# Patient Record
Sex: MALE | Race: WHITE | NOT HISPANIC OR LATINO | Employment: FULL TIME | URBAN - NONMETROPOLITAN AREA
[De-identification: names, ages, dates, MRNs, and addresses within clinical notes are randomized per-mention and may not be internally consistent; named-entity substitution may affect disease eponyms.]

---

## 2018-03-28 ENCOUNTER — OFFICE VISIT (OUTPATIENT)
Dept: INTERNAL MEDICINE | Facility: CLINIC | Age: 58
End: 2018-03-28

## 2018-03-28 VITALS
DIASTOLIC BLOOD PRESSURE: 84 MMHG | WEIGHT: 236 LBS | OXYGEN SATURATION: 98 % | SYSTOLIC BLOOD PRESSURE: 120 MMHG | TEMPERATURE: 98.6 F | HEART RATE: 73 BPM

## 2018-03-28 DIAGNOSIS — R63.5 WEIGHT GAIN: ICD-10-CM

## 2018-03-28 DIAGNOSIS — E11.9 CONTROLLED TYPE 2 DIABETES MELLITUS WITHOUT COMPLICATION, WITH LONG-TERM CURRENT USE OF INSULIN (HCC): Primary | ICD-10-CM

## 2018-03-28 DIAGNOSIS — E78.00 PURE HYPERCHOLESTEROLEMIA: ICD-10-CM

## 2018-03-28 DIAGNOSIS — K21.9 GASTROESOPHAGEAL REFLUX DISEASE, ESOPHAGITIS PRESENCE NOT SPECIFIED: ICD-10-CM

## 2018-03-28 DIAGNOSIS — I10 ESSENTIAL HYPERTENSION: ICD-10-CM

## 2018-03-28 DIAGNOSIS — E78.1 HYPERTRIGLYCERIDEMIA: ICD-10-CM

## 2018-03-28 DIAGNOSIS — Z79.4 CONTROLLED TYPE 2 DIABETES MELLITUS WITHOUT COMPLICATION, WITH LONG-TERM CURRENT USE OF INSULIN (HCC): Primary | ICD-10-CM

## 2018-03-28 PROCEDURE — 99204 OFFICE O/P NEW MOD 45 MIN: CPT | Performed by: PHYSICIAN ASSISTANT

## 2018-03-28 RX ORDER — FLURBIPROFEN SODIUM 0.3 MG/ML
SOLUTION/ DROPS OPHTHALMIC DAILY
COMMUNITY
Start: 2018-02-23 | End: 2019-03-29

## 2018-03-28 RX ORDER — ATORVASTATIN CALCIUM 80 MG/1
80 TABLET, FILM COATED ORAL DAILY
COMMUNITY
End: 2018-05-10 | Stop reason: SDUPTHER

## 2018-03-28 RX ORDER — ASPIRIN 81 MG/1
81 TABLET, CHEWABLE ORAL DAILY
COMMUNITY
End: 2018-05-15 | Stop reason: SDUPTHER

## 2018-03-28 RX ORDER — GEMFIBROZIL 600 MG/1
TABLET, FILM COATED ORAL 2 TIMES DAILY
COMMUNITY
Start: 2018-02-23 | End: 2018-05-10 | Stop reason: SDUPTHER

## 2018-03-28 RX ORDER — INSULIN GLARGINE 100 [IU]/ML
40 INJECTION, SOLUTION SUBCUTANEOUS DAILY
COMMUNITY
Start: 2018-03-02 | End: 2018-05-10 | Stop reason: SDUPTHER

## 2018-03-28 RX ORDER — FLUTICASONE PROPIONATE 50 MCG
2 SPRAY, SUSPENSION (ML) NASAL DAILY
COMMUNITY
Start: 2018-02-23 | End: 2018-05-10 | Stop reason: SDUPTHER

## 2018-03-28 RX ORDER — LORATADINE 10 MG/1
10 TABLET ORAL DAILY
COMMUNITY
End: 2018-05-10 | Stop reason: SDUPTHER

## 2018-03-28 RX ORDER — LISINOPRIL 10 MG/1
TABLET ORAL DAILY
COMMUNITY
Start: 2018-02-09 | End: 2018-05-10 | Stop reason: SDUPTHER

## 2018-03-28 RX ORDER — SILDENAFIL CITRATE 50 MG
TABLET ORAL AS NEEDED
COMMUNITY
Start: 2018-02-23 | End: 2019-06-20 | Stop reason: SDUPTHER

## 2018-03-28 NOTE — PROGRESS NOTES
Subjective   Wale Aguila is a 57 y.o. male who presents to [facility] care. [facility] PCP [facility]  [facility], Dr. Dye.     Chief Complaint:  Diagnosed with type 2 diabetes about 2 years ago and was immediately started on Metformin and Lantus. Glucose has never been above 200 that the knows of but initial A1C was around 10. Last A1C was around 8.  He currently uses 40 units Lantus each evening. He has noticed steady weight gain since he started Lantus, total of about 20 pounds in the last 2 years.  He attempts to practice portion control but is not great about limiting his carbohydrate intake.  Last labs about 2 months ago.  He walks at least 10,000 steps per day and sometimes uses his elliptical for exercise.     He has been using Lipitor and Lopid as directed for the last 4-5 years which he contributes to familial hyperlipidemia.     HTN- Well controlled with Lisinopril. Patient denies chest pain, dyspnea, orthopnea, palpitations, lower extremity edema, headaches, weakness, visual disturbances or confusion.     GERD- well controlled with Nexium.     Chronic but intermittent right shoulder pain since cervical disc herniation around 2008.  No numbness, tingling or burning in the arm.         The following portions of the patient's history were reviewed and updated as appropriate: He  has a past medical history of CPAP (continuous positive airway pressure) dependence; Diabetes mellitus; GERD (gastroesophageal reflux disease); Hyperlipidemia; and Hypertension.  He  does not have a problem list on file.  He  has a past surgical history that includes Neck surgery and Nasal septum surgery.  His family history includes Breast cancer in his cousin, paternal aunt, and paternal grandmother; Lung cancer in his father.  He  reports that he has quit smoking. His smoking use included Electronic Cigarette and Cigarettes. He has a 30.00 pack-year smoking history. He has never used smokeless tobacco. He reports that he  drinks alcohol. He reports that he does not use drugs.  Current Outpatient Prescriptions   Medication Sig Dispense Refill   • aspirin 81 MG chewable tablet Chew 81 mg Daily.     • atorvastatin (LIPITOR) 80 MG tablet Take 80 mg by mouth Daily.     • esomeprazole (nexIUM) 20 MG capsule Take 20 mg by mouth Every Morning Before Breakfast.     • B-D UF III MINI PEN NEEDLES 31G X 5 MM misc Daily.     • fluticasone (FLONASE) 50 MCG/ACT nasal spray 2 sprays into each nostril Daily.     • gemfibrozil (LOPID) 600 MG tablet 2 (Two) Times a Day.     • LANTUS SOLOSTAR 100 UNIT/ML injection pen Inject 40 Units under the skin Daily.     • lisinopril (PRINIVIL,ZESTRIL) 10 MG tablet Daily.     • loratadine (CLARITIN) 10 MG tablet Take 10 mg by mouth Daily.     • metFORMIN (GLUCOPHAGE) 1000 MG tablet 2 (Two) Times a Day.     • VIAGRA 50 MG tablet As Needed.       No current facility-administered medications for this visit.        Review of Systems  Review of Systems   Constitutional: Positive for unexpected weight change. Negative for activity change, appetite change, chills, fatigue and fever.        No malaise   HENT: Negative for ear pain, hearing loss, nosebleeds, rhinorrhea, sore throat, trouble swallowing and voice change.    Eyes: Negative for pain, discharge, redness, itching and visual disturbance.        No dry eyes   Respiratory: Negative for cough, chest tightness, shortness of breath and wheezing.         No SOB with exertion  No SOB lying down   Cardiovascular: Negative for chest pain, palpitations and leg swelling.        No leg cramps   Gastrointestinal: Negative for abdominal pain, blood in stool, constipation, diarrhea, nausea and vomiting.        No frequent heartburn  No change in bowel habits   Endocrine: Negative for cold intolerance, heat intolerance, polydipsia, polyphagia and polyuria.        No Muscle weakness  No feeling of weakness  No Hot flashes   Genitourinary: Negative for decreased urine volume,  difficulty urinating, discharge, dysuria, frequency, hematuria, penile pain, testicular pain and urgency.        No lump on testicles   Musculoskeletal: Negative for arthralgias, gait problem, joint swelling and myalgias.        No limb pain  No limb swelling   Skin: Negative for rash and wound.        No rash  No lesions  No Itching  No change in mole   Neurological: Negative for dizziness, tremors, seizures, syncope, weakness, numbness and headaches.        No trouble walking  No confusion  No tingling       Hematological: Negative for adenopathy. Does not bruise/bleed easily.   Psychiatric/Behavioral: Negative for dysphoric mood, sleep disturbance and suicidal ideas. The patient is not nervous/anxious.         No change in personality         Objective    /84   Pulse 73   Temp 98.6 °F (37 °C)   Wt 107 kg (236 lb)   SpO2 98%   Physical Exam   Constitutional: He is oriented to person, place, and time. He appears well-developed and well-nourished. No distress.   HENT:   Head: Normocephalic and atraumatic.   Right Ear: External ear normal.   Left Ear: External ear normal.   Mouth/Throat: Oropharynx is clear and moist. No oropharyngeal exudate.   Eyes: EOM are normal. Pupils are equal, round, and reactive to light. No scleral icterus.   Neck: Normal range of motion. Neck supple. No thyromegaly present.   Cardiovascular: Normal rate, regular rhythm and normal heart sounds.  Exam reveals no gallop and no friction rub.    No murmur heard.  Pulmonary/Chest: Effort normal and breath sounds normal. No respiratory distress. He has no wheezes. He has no rales. He exhibits no tenderness.   Abdominal: Soft. Bowel sounds are normal. He exhibits no distension. There is no tenderness. There is no guarding.   Musculoskeletal: Normal range of motion. He exhibits no tenderness.   Lymphadenopathy:     He has no cervical adenopathy.   Neurological: He is alert and oriented to person, place, and time. He exhibits normal muscle  tone.   Skin: Skin is warm and dry. He is not diaphoretic.   Psychiatric: He has a normal mood and affect. His behavior is normal. Judgment and thought content normal.   Nursing note and vitals reviewed.        Assessment/Plan      Diagnosis Plan   1. Controlled type 2 diabetes mellitus without complication, with long-term current use of insulin  Obtain labs from 2 months ago. Continue current medication regimen in the meantime.    2. Essential hypertension  Well controlled with Lisinopril, continue     3. Pure hypercholesterolemia  Taking Lipitor as directed, continue.      4. Hypertriglyceridemia  Taking Lopid as directed.      5. Weight gain  Consider TSH if not recently checked.      6. Gastroesophageal reflux disease, esophagitis presence not specified  Well controlled with Nexium daily, continue.      Obtain labs from previous PCP. Records release signed.       Return in about 6 months (around 9/28/2018) for Next scheduled follow up.

## 2018-05-10 ENCOUNTER — TELEPHONE (OUTPATIENT)
Dept: INTERNAL MEDICINE | Facility: CLINIC | Age: 58
End: 2018-05-10

## 2018-05-10 RX ORDER — FLUTICASONE PROPIONATE 50 MCG
2 SPRAY, SUSPENSION (ML) NASAL DAILY
Qty: 3 BOTTLE | Refills: 3 | Status: SHIPPED | OUTPATIENT
Start: 2018-05-10 | End: 2018-05-15 | Stop reason: SDUPTHER

## 2018-05-10 RX ORDER — LISINOPRIL 10 MG/1
10 TABLET ORAL DAILY
Qty: 90 TABLET | Refills: 1 | Status: SHIPPED | OUTPATIENT
Start: 2018-05-10 | End: 2018-05-15 | Stop reason: SDUPTHER

## 2018-05-10 RX ORDER — GEMFIBROZIL 600 MG/1
600 TABLET, FILM COATED ORAL 2 TIMES DAILY
Qty: 180 TABLET | Refills: 1 | Status: SHIPPED | OUTPATIENT
Start: 2018-05-10 | End: 2018-05-15 | Stop reason: SDUPTHER

## 2018-05-10 RX ORDER — LORATADINE 10 MG/1
10 TABLET ORAL DAILY
Qty: 90 TABLET | Refills: 1 | Status: SHIPPED | OUTPATIENT
Start: 2018-05-10 | End: 2018-05-15 | Stop reason: SDUPTHER

## 2018-05-10 RX ORDER — ATORVASTATIN CALCIUM 80 MG/1
80 TABLET, FILM COATED ORAL DAILY
Qty: 90 TABLET | Refills: 1 | Status: SHIPPED | OUTPATIENT
Start: 2018-05-10 | End: 2018-05-15 | Stop reason: SDUPTHER

## 2018-05-10 NOTE — TELEPHONE ENCOUNTER
Pt left message on refill line that he needs all his prescriptions sent to [facility]

## 2018-05-15 RX ORDER — ASPIRIN 81 MG/1
81 TABLET, CHEWABLE ORAL DAILY
Qty: 90 TABLET | Refills: 1 | Status: SHIPPED | OUTPATIENT
Start: 2018-05-15

## 2018-05-15 RX ORDER — FLUTICASONE PROPIONATE 50 MCG
2 SPRAY, SUSPENSION (ML) NASAL DAILY
Qty: 3 BOTTLE | Refills: 3 | Status: SHIPPED | OUTPATIENT
Start: 2018-05-15 | End: 2019-07-18 | Stop reason: SDUPTHER

## 2018-05-15 RX ORDER — ATORVASTATIN CALCIUM 80 MG/1
80 TABLET, FILM COATED ORAL DAILY
Qty: 90 TABLET | Refills: 1 | Status: SHIPPED | OUTPATIENT
Start: 2018-05-15 | End: 2018-10-02 | Stop reason: SDUPTHER

## 2018-05-15 RX ORDER — LISINOPRIL 10 MG/1
10 TABLET ORAL DAILY
Qty: 90 TABLET | Refills: 1 | Status: SHIPPED | OUTPATIENT
Start: 2018-05-15 | End: 2018-12-05 | Stop reason: SDUPTHER

## 2018-05-15 RX ORDER — GEMFIBROZIL 600 MG/1
600 TABLET, FILM COATED ORAL 2 TIMES DAILY
Qty: 180 TABLET | Refills: 1 | Status: SHIPPED | OUTPATIENT
Start: 2018-05-15 | End: 2018-09-28

## 2018-05-15 RX ORDER — LORATADINE 10 MG/1
10 TABLET ORAL DAILY
Qty: 90 TABLET | Refills: 1 | Status: SHIPPED | OUTPATIENT
Start: 2018-05-15 | End: 2018-10-28 | Stop reason: SDUPTHER

## 2018-09-12 ENCOUNTER — TELEPHONE (OUTPATIENT)
Dept: INTERNAL MEDICINE | Facility: CLINIC | Age: 58
End: 2018-09-12

## 2018-09-28 ENCOUNTER — OFFICE VISIT (OUTPATIENT)
Dept: INTERNAL MEDICINE | Facility: CLINIC | Age: 58
End: 2018-09-28

## 2018-09-28 VITALS
OXYGEN SATURATION: 99 % | HEART RATE: 75 BPM | HEIGHT: 70 IN | BODY MASS INDEX: 28.35 KG/M2 | TEMPERATURE: 98.5 F | WEIGHT: 198 LBS | SYSTOLIC BLOOD PRESSURE: 120 MMHG | DIASTOLIC BLOOD PRESSURE: 70 MMHG

## 2018-09-28 DIAGNOSIS — Z79.4 CONTROLLED TYPE 2 DIABETES MELLITUS WITHOUT COMPLICATION, WITH LONG-TERM CURRENT USE OF INSULIN (HCC): Primary | ICD-10-CM

## 2018-09-28 DIAGNOSIS — E78.2 MIXED HYPERLIPIDEMIA: ICD-10-CM

## 2018-09-28 DIAGNOSIS — Z11.59 NEED FOR HEPATITIS C SCREENING TEST: ICD-10-CM

## 2018-09-28 DIAGNOSIS — E11.9 CONTROLLED TYPE 2 DIABETES MELLITUS WITHOUT COMPLICATION, WITH LONG-TERM CURRENT USE OF INSULIN (HCC): Primary | ICD-10-CM

## 2018-09-28 DIAGNOSIS — I10 ESSENTIAL HYPERTENSION: ICD-10-CM

## 2018-09-28 DIAGNOSIS — Z23 NEED FOR INFLUENZA VACCINATION: ICD-10-CM

## 2018-09-28 PROBLEM — E78.00 PURE HYPERCHOLESTEROLEMIA: Status: ACTIVE | Noted: 2018-09-28

## 2018-09-28 PROCEDURE — 90471 IMMUNIZATION ADMIN: CPT | Performed by: PHYSICIAN ASSISTANT

## 2018-09-28 PROCEDURE — 90674 CCIIV4 VAC NO PRSV 0.5 ML IM: CPT | Performed by: PHYSICIAN ASSISTANT

## 2018-09-28 PROCEDURE — 99214 OFFICE O/P EST MOD 30 MIN: CPT | Performed by: PHYSICIAN ASSISTANT

## 2018-09-28 RX ORDER — GEMFIBROZIL 600 MG/1
600 TABLET, FILM COATED ORAL 2 TIMES DAILY
Qty: 180 TABLET | Refills: 1 | Status: CANCELLED | OUTPATIENT
Start: 2018-09-28

## 2018-09-28 NOTE — PROGRESS NOTES
Wale Aguila is a 58 y.o. male.     Subjective   History of Present Illness   Here today for follow up of chronic conditions.     DM type 2:  He has lost nearly 40 pounds in the last 6 months intentionally with diet and staying active.  He occasionally checks his glucose and it is around 70-90 5+ hours after eating and 150-160 around 2 hours postprandial.  He is up to date with an eye exam. He is taking Metformin 1000 mg bid and Lantus 40 units nightly. Patient denies foot ulcerations, hyperglycemia, hypoglycemia, nausea, paresthesia of the feet, polydipsia, polyuria, polyphagia and visual disturbances.    GERD: He found that he has not needed to take Nexium for acid reflux in the last 2 months since losing weight.     HTN: blood pressure is well controlled. Patient denies chest pain, dyspnea, orthopnea, palpitations, lower extremity edema, headaches, weakness, visual disturbances or confusion.     HLD: he has had some myalgias with Lipitor. He also takes Lopid which is known to cause higher potential for rhabdomyolysis in combination with some statins.       The following portions of the patient's history were reviewed and updated as appropriate: allergies, current medications, past family history, past medical history, past social history, past surgical history and problem list.    Review of Systems    Constitutional: intentional weight loss. Negative for appetite change, chills, fatigue, fever.  Respiratory: Negative for cough, chest tightness, shortness of breath and wheezing.    Cardiovascular: Negative for chest pain, palpitations and leg swelling.   Gastrointestinal: acid reflux-resolved. Negative for abdominal distention, abdominal pain, blood in stool, constipation, diarrhea, nausea and vomiting.   Endocrine: Negative for cold intolerance, heat intolerance, polydipsia, polyphagia and polyuria.   Musculoskeletal: myalgias. Negative for arthralgias, back pain, joint swelling, neck pain and neck stiffness.  "  Skin: Negative for color change, pallor, rash and wound.   Allergic/Immunologic: Negative for environmental allergies, food allergies and immunocompromised state.   Neurological: Negative for dizziness, tremors, seizures, weakness, numbness and headaches.   Hematological: Negative for adenopathy. Does not bruise/bleed easily.   Psychiatric/Behavioral: Negative for sleep disturbances, agitation, behavioral problems, confusion, hallucinations, self-injury and suicidal ideas. The patient is not nervous/anxious.      Objective    Physical Exam  Constitutional: Appears well-developed and well-nourished.   Cardiovascular: Normal rate, regular rhythm and normal heart sounds.    Pulmonary/Chest: Effort normal and breath sounds normal. No respiratory distress.  Has no wheezes or rales. Exhibits no chest wall tenderness.   Abdominal: Soft. Bowel sounds are normal. Exhibits no distension and no mass. There is no tenderness.   Musculoskeletal: Normal range of motion. Exhibits no tenderness.   Neurological: Alert and oriented to person, place, and time.   Skin: Skin is warm and dry.   Psychiatric: Has a normal mood and affect. Behavior is normal. Judgment and thought content normal.       /70   Pulse 75   Temp 98.5 °F (36.9 °C)   Ht 177.8 cm (70\")   Wt 89.8 kg (198 lb)   SpO2 99%   BMI 28.41 kg/m²     Nursing note and vitals reviewed.        Assessment/Plan   Wale was seen today for follow-up and diabetes.    Diagnoses and all orders for this visit:    Controlled type 2 diabetes mellitus without complication, with long-term current use of insulin (CMS/[facility])  -     Hemoglobin A1c  -     MicroAlbumin, Urine, Random - Urine, Clean Catch  Follow diabetic diet. Take all medications as prescribed.  Exercise as tolerated up to 30 minutes 5 days a week.  Monitor blood sugars as discussed. See eye doctor annually.  Wear protective foot wear/no bare feet. Check feet regularly for calluses or ulcers.  Discussed risk of poorly " controlled diabetes and long-term complications.    -He would like to come off Lantus and begin a second diabetes med if possible.  Will consider after review of labs.     Essential hypertension  -     Comprehensive Metabolic Panel    Mixed hyperlipidemia  -     Lipid Panel  Stop lopid due to potential for rhabdo with statin use. Has drastically reduced carb/sugar intake and lost 40 pounds.   Continue Lipitor.     Need for hepatitis C screening test  -     Hepatitis C Antibody    Need for influenza vaccination  -     Flucelvax Quad (Vial) =>4 yrs ([id number])        Return in 6 months for annual physical.

## 2018-09-29 LAB
ALBUMIN SERPL-MCNC: 4.6 G/DL (ref 3.5–5)
ALBUMIN/GLOB SERPL: 2 G/DL (ref 1–2)
ALP SERPL-CCNC: 102 U/L (ref 38–126)
ALT SERPL-CCNC: 48 U/L (ref 13–69)
AST SERPL-CCNC: 23 U/L (ref 15–46)
BILIRUB SERPL-MCNC: 0.6 MG/DL (ref 0.2–1.3)
BUN SERPL-MCNC: 11 MG/DL (ref 7–20)
BUN/CREAT SERPL: 10 (ref 6.3–21.9)
CALCIUM SERPL-MCNC: 10.3 MG/DL (ref 8.4–10.2)
CHLORIDE SERPL-SCNC: 105 MMOL/L (ref 98–107)
CHOLEST SERPL-MCNC: 93 MG/DL (ref 0–199)
CO2 SERPL-SCNC: 29 MMOL/L (ref 26–30)
CREAT SERPL-MCNC: 1.1 MG/DL (ref 0.6–1.3)
GLOBULIN SER CALC-MCNC: 2.3 GM/DL
GLUCOSE SERPL-MCNC: 86 MG/DL (ref 74–98)
HBA1C MFR BLD: 5.4 %
HCV AB S/CO SERPL IA: <0.1 S/CO RATIO (ref 0–0.9)
HDLC SERPL-MCNC: 40 MG/DL (ref 40–60)
LDLC SERPL CALC-MCNC: 32 MG/DL (ref 0–99)
MICROALBUMIN UR-MCNC: <3 UG/ML
POTASSIUM SERPL-SCNC: 4.1 MMOL/L (ref 3.5–5.1)
PROT SERPL-MCNC: 6.9 G/DL (ref 6.3–8.2)
SODIUM SERPL-SCNC: 142 MMOL/L (ref 137–145)
TRIGL SERPL-MCNC: 107 MG/DL
VLDLC SERPL CALC-MCNC: 21.4 MG/DL

## 2018-10-02 DIAGNOSIS — E11.9 TYPE 2 DIABETES MELLITUS WITHOUT COMPLICATION, UNSPECIFIED WHETHER LONG TERM INSULIN USE (HCC): Primary | ICD-10-CM

## 2018-10-02 RX ORDER — ATORVASTATIN CALCIUM 40 MG/1
40 TABLET, FILM COATED ORAL NIGHTLY
Qty: 90 TABLET | Refills: 3 | Status: SHIPPED | OUTPATIENT
Start: 2018-10-02 | End: 2018-10-02 | Stop reason: SDUPTHER

## 2018-10-02 RX ORDER — ATORVASTATIN CALCIUM 40 MG/1
40 TABLET, FILM COATED ORAL NIGHTLY
Qty: 90 TABLET | Refills: 3 | Status: SHIPPED | OUTPATIENT
Start: 2018-10-02 | End: 2018-11-30 | Stop reason: SDUPTHER

## 2018-10-02 RX ORDER — GLIPIZIDE 2.5 MG/1
2.5 TABLET, EXTENDED RELEASE ORAL
Qty: 90 TABLET | Refills: 3 | Status: SHIPPED | OUTPATIENT
Start: 2018-10-02 | End: 2018-10-02 | Stop reason: SDUPTHER

## 2018-10-02 RX ORDER — GLIPIZIDE 2.5 MG/1
2.5 TABLET, EXTENDED RELEASE ORAL
Qty: 90 TABLET | Refills: 3 | Status: SHIPPED | OUTPATIENT
Start: 2018-10-02 | End: 2018-11-30 | Stop reason: SDUPTHER

## 2018-10-10 ENCOUNTER — PATIENT MESSAGE (OUTPATIENT)
Dept: INTERNAL MEDICINE | Facility: CLINIC | Age: 58
End: 2018-10-10

## 2018-10-11 RX ORDER — LANCETS 28 GAUGE
EACH MISCELLANEOUS
Qty: 100 EACH | Refills: 12 | Status: SHIPPED | OUTPATIENT
Start: 2018-10-11 | End: 2021-12-08

## 2018-10-11 NOTE — TELEPHONE ENCOUNTER
From: Wale Aguila  To: Jodi Monique PA  Sent: 10/10/2018 8:13 PM EDT  Subject: Prescription Question    Ms Monique, I am needing a prescription for FreeStyle Lancets Mis Abbo please.     I had it last filled at the [facility] pharmacy and the refills are . My Blood Surgar has been 78-80s just before dinner.     I attacked a picture of the box.     Thank you,

## 2018-10-29 RX ORDER — LORATADINE 10 MG/1
TABLET ORAL
Qty: 90 TABLET | Refills: 3 | Status: SHIPPED | OUTPATIENT
Start: 2018-10-29 | End: 2019-10-01

## 2018-11-30 RX ORDER — ATORVASTATIN CALCIUM 40 MG/1
40 TABLET, FILM COATED ORAL NIGHTLY
Qty: 90 TABLET | Refills: 1 | Status: SHIPPED | OUTPATIENT
Start: 2018-11-30 | End: 2019-03-29 | Stop reason: SDUPTHER

## 2018-11-30 RX ORDER — GLIPIZIDE 2.5 MG/1
2.5 TABLET, EXTENDED RELEASE ORAL
Qty: 90 TABLET | Refills: 1 | Status: SHIPPED | OUTPATIENT
Start: 2018-11-30 | End: 2019-03-29 | Stop reason: SDUPTHER

## 2018-12-05 RX ORDER — LISINOPRIL 10 MG/1
TABLET ORAL
Qty: 90 TABLET | Refills: 1 | Status: SHIPPED | OUTPATIENT
Start: 2018-12-05 | End: 2019-03-29 | Stop reason: SDUPTHER

## 2019-03-29 ENCOUNTER — OFFICE VISIT (OUTPATIENT)
Dept: INTERNAL MEDICINE | Facility: CLINIC | Age: 59
End: 2019-03-29

## 2019-03-29 VITALS
SYSTOLIC BLOOD PRESSURE: 118 MMHG | BODY MASS INDEX: 28.63 KG/M2 | DIASTOLIC BLOOD PRESSURE: 82 MMHG | OXYGEN SATURATION: 99 % | WEIGHT: 200 LBS | HEART RATE: 74 BPM | TEMPERATURE: 98.4 F | HEIGHT: 70 IN

## 2019-03-29 DIAGNOSIS — Z12.5 SCREENING PSA (PROSTATE SPECIFIC ANTIGEN): ICD-10-CM

## 2019-03-29 DIAGNOSIS — E78.00 PURE HYPERCHOLESTEROLEMIA: ICD-10-CM

## 2019-03-29 DIAGNOSIS — Z79.4 CONTROLLED TYPE 2 DIABETES MELLITUS WITHOUT COMPLICATION, WITH LONG-TERM CURRENT USE OF INSULIN (HCC): ICD-10-CM

## 2019-03-29 DIAGNOSIS — I10 ESSENTIAL HYPERTENSION: ICD-10-CM

## 2019-03-29 DIAGNOSIS — E11.9 CONTROLLED TYPE 2 DIABETES MELLITUS WITHOUT COMPLICATION, WITH LONG-TERM CURRENT USE OF INSULIN (HCC): ICD-10-CM

## 2019-03-29 DIAGNOSIS — Z00.00 ANNUAL PHYSICAL EXAM: Primary | ICD-10-CM

## 2019-03-29 LAB
ALBUMIN SERPL-MCNC: 4.3 G/DL (ref 3.5–5)
ALBUMIN/GLOB SERPL: 1.7 G/DL (ref 1–2)
ALP SERPL-CCNC: 110 U/L (ref 38–126)
ALT SERPL-CCNC: 43 U/L (ref 13–69)
AST SERPL-CCNC: 21 U/L (ref 15–46)
BILIRUB SERPL-MCNC: 0.4 MG/DL (ref 0.2–1.3)
BUN SERPL-MCNC: 17 MG/DL (ref 7–20)
BUN/CREAT SERPL: 15.5 (ref 6.3–21.9)
CALCIUM SERPL-MCNC: 10.8 MG/DL (ref 8.4–10.2)
CHLORIDE SERPL-SCNC: 104 MMOL/L (ref 98–107)
CO2 SERPL-SCNC: 26 MMOL/L (ref 26–30)
CREAT SERPL-MCNC: 1.1 MG/DL (ref 0.6–1.3)
GLOBULIN SER CALC-MCNC: 2.5 GM/DL
GLUCOSE SERPL-MCNC: 122 MG/DL (ref 74–98)
HBA1C MFR BLD: 5.6 % (ref 4.8–5.6)
POTASSIUM SERPL-SCNC: 4.5 MMOL/L (ref 3.5–5.1)
PROT SERPL-MCNC: 6.8 G/DL (ref 6.3–8.2)
PSA SERPL-MCNC: 0.97 NG/ML (ref 0.06–4)
SODIUM SERPL-SCNC: 141 MMOL/L (ref 137–145)

## 2019-03-29 PROCEDURE — 99396 PREV VISIT EST AGE 40-64: CPT | Performed by: PHYSICIAN ASSISTANT

## 2019-03-29 RX ORDER — GLIPIZIDE 2.5 MG/1
2.5 TABLET, EXTENDED RELEASE ORAL
Qty: 90 TABLET | Refills: 1 | Status: SHIPPED | OUTPATIENT
Start: 2019-03-29 | End: 2019-10-01

## 2019-03-29 RX ORDER — ATORVASTATIN CALCIUM 40 MG/1
40 TABLET, FILM COATED ORAL NIGHTLY
Qty: 90 TABLET | Refills: 1 | Status: SHIPPED | OUTPATIENT
Start: 2019-03-29 | End: 2019-10-01 | Stop reason: SDUPTHER

## 2019-03-29 RX ORDER — LISINOPRIL 10 MG/1
10 TABLET ORAL DAILY
Qty: 90 TABLET | Refills: 1 | Status: SHIPPED | OUTPATIENT
Start: 2019-03-29 | End: 2019-10-01 | Stop reason: SDUPTHER

## 2019-03-29 NOTE — PROGRESS NOTES
Subjective   Wale Aguila is a 58 y.o. male and is here for a comprehensive physical exam. The patient reports no new problems.    HPI: he does not check his glucose daily but when he does it runs 80-90 fasting and up to 116 before dinner. He has discontinued Lantus entirely as directed and is taking Metformin and Glipizide as directed. He has had some numbness and tingling in his feet for the last couple of years which is more prominent in the evenings but is not too bothersome. Patient denies foot ulcerations, hyperglycemia, hypoglycemia, nausea, polydipsia, polyuria, polyphagia, visual disturbances and weight loss.  He keeps a healthy diet and stays physically active with outside work.           Do you take any herbs or supplements that were not prescribed by a doctor? yes, multi-vitamin  Are you taking calcium supplements? No  Are you taking aspirin daily? Yes     History:  Patient receives prostate care here: Yes  Date last prostate exam: couple of years  Date last PSA: unsure  He reports Some decrease in urinary stream,  Some nocturia, No dribbling, No hesitancy.    Family history of prostate cancer: no.     has no sexual activity history on file.    The following portions of the patient's history were reviewed and updated as appropriate: He  has a past medical history of CPAP (continuous positive airway pressure) dependence, Diabetes mellitus (CMS/HCC), GERD (gastroesophageal reflux disease), Hyperlipidemia, and Hypertension.  He does not have any pertinent problems on file.  He  has a past surgical history that includes Neck surgery and Nasal septum surgery.  His family history includes Breast cancer in his cousin, paternal aunt, and paternal grandmother; Lung cancer in his father.  He  reports that he has quit smoking. His smoking use included electronic cigarette and cigarettes. He has a 30.00 pack-year smoking history. He has never used smokeless tobacco. He reports that he drinks alcohol. He reports  that he does not use drugs.  Current Outpatient Medications   Medication Sig Dispense Refill   • aspirin 81 MG chewable tablet Chew 1 tablet Daily. 90 tablet 1   • atorvastatin (LIPITOR) 40 MG tablet Take 1 tablet by mouth Every Night. 90 tablet 1   • fluticasone (FLONASE) 50 MCG/ACT nasal spray 2 sprays into each nostril Daily. 3 bottle 3   • glipiZIDE (GLUCOTROL XL) 2.5 MG 24 hr tablet Take 1 tablet by mouth Daily With Breakfast. 90 tablet 1   • Lancets (FREESTYLE) lancets Use one a day, as directed 100 each 12   • lisinopril (PRINIVIL,ZESTRIL) 10 MG tablet Take 1 tablet by mouth Daily. 90 tablet 1   • loratadine (CLARITIN) 10 MG tablet TAKE 1 TABLET DAILY 90 tablet 3   • metFORMIN (GLUCOPHAGE) 1000 MG tablet TAKE 1 TABLET TWICE A  tablet 1   • VIAGRA 50 MG tablet As Needed.       No current facility-administered medications for this visit.        Review of Systems  Do you have pain that bothers you in your daily life? no    Review of Systems   Constitutional: Negative for appetite change, chills, fatigue, fever and unexpected weight change.   HENT: Negative for congestion, ear pain, hearing loss, nosebleeds, postnasal drip, rhinorrhea, sore throat, tinnitus and trouble swallowing.    Eyes: Negative for photophobia, pain, discharge and visual disturbance.   Respiratory: Negative for cough, chest tightness, shortness of breath and wheezing.    Cardiovascular: Negative for chest pain, palpitations and leg swelling.   Gastrointestinal: Negative for abdominal distention, abdominal pain, blood in stool, constipation, diarrhea, nausea and vomiting.   Endocrine: Negative for cold intolerance, heat intolerance, polydipsia, polyphagia and polyuria.   Genitourinary: Positive for frequency (mild nocturia). Negative for decreased urine volume, difficulty urinating, discharge, dysuria, enuresis, hematuria, penile swelling, testicular pain and urgency.   Musculoskeletal: Positive for back pain (intermittent ache).  "Negative for arthralgias, joint swelling, myalgias, neck pain and neck stiffness.   Skin: Negative for color change, pallor, rash and wound.   Allergic/Immunologic: Negative for environmental allergies, food allergies and immunocompromised state.   Neurological: Positive for numbness. Negative for dizziness, tremors, seizures, weakness and headaches.   Hematological: Negative for adenopathy. Does not bruise/bleed easily.   Psychiatric/Behavioral: Negative for agitation, behavioral problems, confusion, hallucinations, self-injury and suicidal ideas. The patient is not nervous/anxious.          Objective   /82   Pulse 74   Temp 98.4 °F (36.9 °C)   Ht 177.8 cm (70\")   Wt 90.7 kg (200 lb)   SpO2 99%   BMI 28.70 kg/m²     Physical Exam   Constitutional: He is oriented to person, place, and time. He appears well-developed and well-nourished. No distress.   HENT:   Head: Normocephalic and atraumatic.   Right Ear: External ear normal.   Left Ear: External ear normal.   Nose: Nose normal.   Mouth/Throat: Oropharynx is clear and moist. No oropharyngeal exudate.   Eyes: Conjunctivae and EOM are normal. Pupils are equal, round, and reactive to light. No scleral icterus.   Neck: Normal range of motion. Neck supple. No thyromegaly present.   Cardiovascular: Normal rate, regular rhythm, normal heart sounds and intact distal pulses. Exam reveals no gallop and no friction rub.   No murmur heard.  Pulmonary/Chest: Effort normal and breath sounds normal. No stridor. No respiratory distress. He has no wheezes. He has no rales. He exhibits no tenderness.   Abdominal: Soft. Bowel sounds are normal. He exhibits no distension and no mass. There is no tenderness. There is no rebound and no guarding. No hernia.   Musculoskeletal: Normal range of motion. He exhibits no tenderness.    Wale had a diabetic foot exam performed today.   During the foot exam he had a monofilament test performed.    Neurological Sensory Findings - " Unaltered hot/cold right ankle/foot discrimination and unaltered hot/cold left ankle/foot discrimination. Unaltered sharp/dull right ankle/foot discrimination and unaltered sharp/dull left ankle/foot discrimination. No right ankle/foot altered proprioception and no left ankle/foot altered proprioception  Vascular Status -  His right foot exhibits normal foot vasculature  and no edema. His left foot exhibits normal foot vasculature  and no edema.  Skin Integrity  -  His right foot skin is intact.His left foot skin is intact..  Lymphadenopathy:     He has no cervical adenopathy.   Neurological: He is alert and oriented to person, place, and time. He displays normal reflexes. No cranial nerve deficit or sensory deficit.   Skin: Skin is warm and dry. Capillary refill takes less than 2 seconds. No rash noted. He is not diaphoretic. No pallor.   Psychiatric: He has a normal mood and affect. His behavior is normal. Judgment and thought content normal.   Nursing note and vitals reviewed.         Assessment/Plan   Healthy male exam.     1.    Diagnosis Plan   1. Annual physical exam     2. BMI 28.0-28.9,adult  Patient's Body mass index is 28.7 kg/m². BMI is above normal parameters. Recommendations include: exercise counseling and nutrition counseling.     3. Controlled type 2 diabetes mellitus without complication, with long-term current use of insulin (CMS/[facility])  Continue: glipiZIDE (GLUCOTROL XL) 2.5 MG 24 hr tablet  Continue: Metformin 1000 mg bid.     Hemoglobin A1c    Comprehensive Metabolic Panel    Follow diabetic diet. Take all medications as prescribed.  Exercise as tolerated up to 30 minutes 5 days a week.  Monitor blood sugars as discussed. See eye doctor annually.  Wear protective foot wear/no bare feet. Check feet regularly for calluses or ulcers.  Discussed risk of poorly controlled diabetes and long-term complications.     4. Pure hypercholesterolemia  Continue: atorvastatin (LIPITOR) 40 MG tablet     5.  Essential hypertension  Well controlled, continue: lisinopril (PRINIVIL,ZESTRIL) 10 MG tablet    Comprehensive Metabolic Panel     6. Screening PSA (prostate specific antigen)  PSA Screen       2. Patient Counseling:  --Nutrition: Stressed importance of moderation in sodium/caffeine intake, saturated fat and cholesterol, caloric balance, sufficient intake of fresh fruits, vegetables, fiber, calcium and iron.  --Discussed the issue of calcium supplement, and the daily use of baby aspirin if applicable.  --Exercise: Stressed the importance of regular exercise.   --Substance Abuse: Discussed cessation/primary prevention of tobacco (if applicable, alcohol, or other drug use (if applicable); driving or other dangerous activities under the influence; availability of treatment for abuse.    --Sexuality: Discussed sexually transmitted diseases, partner selection, use of condoms, avoidance of unintended pregnancy  and contraceptive alternatives.   --Injury prevention: Discussed safety belts, safety helmets, smoke detector, smoking near bedding or upholstery.   --Dental health: Discussed importance of regular tooth brushing, flossing, and dental visits.  --Immunizations reviewed.  --Discussed benefits of screening colonoscopy (if applicable).  --After hours service discussed with patient.    3. Discussed the patient's BMI with him.  The BMI is above average; BMI management plan is completed.  4. Return in about 6 months (around 9/29/2019) for Next scheduled follow up.         REENA Morocho  03/29/2019  8:17 AM

## 2019-04-01 DIAGNOSIS — E83.52 SERUM CALCIUM ELEVATED: Primary | ICD-10-CM

## 2019-04-16 LAB — CA-I SERPL ISE-MCNC: 5.8 MG/DL (ref 4.5–5.6)

## 2019-04-18 DIAGNOSIS — E83.52 HYPERCALCEMIA: ICD-10-CM

## 2019-04-18 DIAGNOSIS — E83.52 SERUM CALCIUM ELEVATED: Primary | ICD-10-CM

## 2019-04-27 LAB
1,25(OH)2D3 SERPL-MCNC: 33.9 PG/ML (ref 19.9–79.3)
25(OH)D3+25(OH)D2 SERPL-MCNC: 54 NG/ML
PTH RELATED PROT SERPL-SCNC: <2 PMOL/L
PTH-INTACT SERPL-MCNC: 27 PG/ML (ref 15–65)

## 2019-04-29 ENCOUNTER — TELEPHONE (OUTPATIENT)
Dept: INTERNAL MEDICINE | Facility: CLINIC | Age: 59
End: 2019-04-29

## 2019-06-20 RX ORDER — SILDENAFIL 50 MG/1
TABLET, FILM COATED ORAL
Qty: 30 TABLET | Refills: 5 | Status: SHIPPED | OUTPATIENT
Start: 2019-06-20 | End: 2020-08-27

## 2019-07-18 RX ORDER — FLUTICASONE PROPIONATE 50 MCG
SPRAY, SUSPENSION (ML) NASAL
Qty: 48 G | Refills: 3 | Status: SHIPPED | OUTPATIENT
Start: 2019-07-18 | End: 2020-08-27

## 2019-10-01 ENCOUNTER — OFFICE VISIT (OUTPATIENT)
Dept: INTERNAL MEDICINE | Facility: CLINIC | Age: 59
End: 2019-10-01

## 2019-10-01 VITALS
HEART RATE: 72 BPM | OXYGEN SATURATION: 99 % | TEMPERATURE: 98.5 F | HEIGHT: 70 IN | WEIGHT: 205 LBS | SYSTOLIC BLOOD PRESSURE: 128 MMHG | BODY MASS INDEX: 29.35 KG/M2 | DIASTOLIC BLOOD PRESSURE: 78 MMHG

## 2019-10-01 DIAGNOSIS — E11.9 CONTROLLED TYPE 2 DIABETES MELLITUS WITHOUT COMPLICATION, WITH LONG-TERM CURRENT USE OF INSULIN (HCC): ICD-10-CM

## 2019-10-01 DIAGNOSIS — E78.00 PURE HYPERCHOLESTEROLEMIA: ICD-10-CM

## 2019-10-01 DIAGNOSIS — I10 ESSENTIAL HYPERTENSION: ICD-10-CM

## 2019-10-01 DIAGNOSIS — Z79.4 CONTROLLED TYPE 2 DIABETES MELLITUS WITHOUT COMPLICATION, WITH LONG-TERM CURRENT USE OF INSULIN (HCC): ICD-10-CM

## 2019-10-01 DIAGNOSIS — Z23 NEED FOR INFLUENZA VACCINATION: Primary | ICD-10-CM

## 2019-10-01 PROCEDURE — 90674 CCIIV4 VAC NO PRSV 0.5 ML IM: CPT | Performed by: PHYSICIAN ASSISTANT

## 2019-10-01 PROCEDURE — 99214 OFFICE O/P EST MOD 30 MIN: CPT | Performed by: PHYSICIAN ASSISTANT

## 2019-10-01 PROCEDURE — 90471 IMMUNIZATION ADMIN: CPT | Performed by: PHYSICIAN ASSISTANT

## 2019-10-01 RX ORDER — ATORVASTATIN CALCIUM 40 MG/1
40 TABLET, FILM COATED ORAL NIGHTLY
Qty: 90 TABLET | Refills: 1 | Status: SHIPPED | OUTPATIENT
Start: 2019-10-01 | End: 2020-06-24

## 2019-10-01 RX ORDER — LISINOPRIL 10 MG/1
10 TABLET ORAL DAILY
Qty: 90 TABLET | Refills: 1 | Status: SHIPPED | OUTPATIENT
Start: 2019-10-01 | End: 2020-05-18

## 2019-10-01 RX ORDER — LEVOCETIRIZINE DIHYDROCHLORIDE 5 MG/1
5 TABLET, FILM COATED ORAL EVERY EVENING
Qty: 90 TABLET | Refills: 3 | Status: SHIPPED | OUTPATIENT
Start: 2019-10-01 | End: 2020-11-30

## 2019-10-01 RX ORDER — GLIPIZIDE 2.5 MG/1
2.5 TABLET, EXTENDED RELEASE ORAL
Qty: 90 TABLET | Refills: 1 | Status: CANCELLED | OUTPATIENT
Start: 2019-10-01

## 2019-10-01 NOTE — PROGRESS NOTES
Wale Aguila is a 59 y.o. male.     Subjective   History of Present Illness   Here today for follow up of diabetes and hypertension.  Blood pressure has been very well controlled with lisinopril. Patient denies chest pain, dyspnea, orthopnea, palpitations, lower extremity edema, headaches, weakness, visual disturbances or confusion.     Diabetes has been very well controlled for several years with metformin and glipizide. Patient denies foot ulcerations, hyperglycemia, hypoglycemia, nausea, paresthesia of the feet, polydipsia, polyuria, polyphagia, visual disturbances and weight loss. Fasting glucose at home runs .     He continues to use an e-cigarette at 3% nicotine content. He will continue to try to further reduce. He declined smoking cessation assistance today.       The following portions of the patient's history were reviewed and updated as appropriate: allergies, current medications, past family history, past medical history, past social history, past surgical history and problem list.    Review of Systems   Constitutional: Negative for appetite change, chills, fatigue, fever and unexpected weight change.   Eyes: Negative for visual disturbance.   Respiratory: Negative for cough, chest tightness, shortness of breath and wheezing.    Cardiovascular: Negative for chest pain, palpitations and leg swelling.   Gastrointestinal: Negative for abdominal distention, blood in stool, diarrhea, rectal pain and vomiting.   Endocrine: Negative for cold intolerance, heat intolerance, polydipsia, polyphagia and polyuria.   Allergic/Immunologic: Negative for immunocompromised state.   Neurological: Negative for dizziness, tremors, speech difficulty, weakness and headaches.   Hematological: Negative for adenopathy. Does not bruise/bleed easily.   Psychiatric/Behavioral: Negative for agitation, confusion, dysphoric mood, self-injury and suicidal ideas. The patient is not nervous/anxious.          Objective    Physical  "Exam   Constitutional: He is oriented to person, place, and time. He appears well-developed and well-nourished. No distress.   HENT:   Head: Normocephalic and atraumatic.   Clear postnasal drip.   Eyes: Conjunctivae and EOM are normal. Pupils are equal, round, and reactive to light.   Neck: Normal range of motion. Neck supple.   Cardiovascular: Normal rate, regular rhythm and normal heart sounds. Exam reveals no gallop and no friction rub.   No murmur heard.  Pulmonary/Chest: Effort normal and breath sounds normal. No stridor. No respiratory distress. He has no wheezes. He has no rales. He exhibits no tenderness.   Abdominal: Soft. Bowel sounds are normal. He exhibits no mass. There is no tenderness. No hernia.   Musculoskeletal: Normal range of motion. He exhibits no edema, tenderness or deformity.   Lymphadenopathy:     He has no cervical adenopathy.   Neurological: He is alert and oriented to person, place, and time. He displays normal reflexes. No cranial nerve deficit or sensory deficit. He exhibits normal muscle tone. Coordination normal.   Skin: Skin is warm and dry. Capillary refill takes less than 2 seconds. No rash noted. He is not diaphoretic. No pallor.   Psychiatric: He has a normal mood and affect. His behavior is normal. Judgment and thought content normal.   Nursing note and vitals reviewed.        /78   Pulse 72   Temp 98.5 °F (36.9 °C)   Ht 177.8 cm (70\")   Wt 93 kg (205 lb)   SpO2 99%   BMI 29.41 kg/m²     Nursing note and vitals reviewed.          Assessment/Plan   Wale was seen today for follow-up.    Diagnoses and all orders for this visit:    Need for influenza vaccination  -     Flucelvax Quad=>4Years ([id number])    Controlled type 2 diabetes mellitus without complication, with long-term current use of insulin (CMS/[facility])  -     Hemoglobin A1c  -     Comprehensive Metabolic Panel  -     MicroAlbumin, Urine, Random - Urine, Clean Catch  Discontinue glipizide XL 2.5 mg if A1C is less " than 6.5.  Continue Metformin 1000 mg bid.     Follow diabetic diet. Take medications as prescribed.  Exercise as tolerated up to 30 minutes 5 days a week.  Monitor blood sugars as discussed. See eye doctor annually.  Wear protective foot wear/no bare feet. Check feet regularly for calluses or ulcers.  Discussed risk of poorly controlled diabetes and long-term complications.    Essential hypertension  -     lisinopril (PRINIVIL,ZESTRIL) 10 MG tablet; Take 1 tablet by mouth Daily.  -     Comprehensive Metabolic Panel  Well controlled, continue lisinopril.     Follow heart healthy/low salt diet.  Avoid processed foods.  Monitor blood pressure as discussed.  Exercise as tolerated up to 30 minutes 5 days per week.  Take medication as prescribed.    Pure hypercholesterolemia  -     atorvastatin (LIPITOR) 40 MG tablet; Take 1 tablet by mouth Every Night.  -     Lipid Panel      Return in 6 months for annual physical.

## 2019-10-05 LAB
ALBUMIN SERPL-MCNC: 4.7 G/DL (ref 3.5–5.2)
ALBUMIN/GLOB SERPL: 2.4 G/DL
ALP SERPL-CCNC: 94 U/L (ref 39–117)
ALT SERPL-CCNC: 27 U/L (ref 1–41)
AST SERPL-CCNC: 16 U/L (ref 1–40)
BILIRUB SERPL-MCNC: 0.3 MG/DL (ref 0.2–1.2)
BUN SERPL-MCNC: 11 MG/DL (ref 6–20)
BUN/CREAT SERPL: 9.4 (ref 7–25)
CALCIUM SERPL-MCNC: 9.6 MG/DL (ref 8.6–10.5)
CHLORIDE SERPL-SCNC: 105 MMOL/L (ref 98–107)
CHOLEST SERPL-MCNC: 113 MG/DL (ref 0–200)
CO2 SERPL-SCNC: 24.5 MMOL/L (ref 22–29)
CREAT SERPL-MCNC: 1.17 MG/DL (ref 0.76–1.27)
GLOBULIN SER CALC-MCNC: 2 GM/DL
GLUCOSE SERPL-MCNC: 115 MG/DL (ref 65–99)
HBA1C MFR BLD: 5.5 % (ref 4.8–5.6)
HDLC SERPL-MCNC: 31 MG/DL (ref 40–60)
LDLC SERPL CALC-MCNC: 25 MG/DL (ref 0–100)
MICROALBUMIN UR-MCNC: <3 UG/ML
POTASSIUM SERPL-SCNC: 4.6 MMOL/L (ref 3.5–5.2)
PROT SERPL-MCNC: 6.7 G/DL (ref 6–8.5)
SODIUM SERPL-SCNC: 142 MMOL/L (ref 136–145)
TRIGL SERPL-MCNC: 283 MG/DL (ref 0–150)
VLDLC SERPL CALC-MCNC: 56.6 MG/DL

## 2020-05-16 DIAGNOSIS — I10 ESSENTIAL HYPERTENSION: ICD-10-CM

## 2020-05-18 RX ORDER — LISINOPRIL 10 MG/1
10 TABLET ORAL DAILY
Qty: 90 TABLET | Refills: 0 | Status: SHIPPED | OUTPATIENT
Start: 2020-05-18 | End: 2020-08-27

## 2020-06-24 DIAGNOSIS — E78.00 PURE HYPERCHOLESTEROLEMIA: ICD-10-CM

## 2020-06-24 RX ORDER — ATORVASTATIN CALCIUM 40 MG/1
TABLET, FILM COATED ORAL
Qty: 90 TABLET | Refills: 0 | Status: SHIPPED | OUTPATIENT
Start: 2020-06-24 | End: 2020-10-05

## 2020-08-11 ENCOUNTER — OFFICE VISIT (OUTPATIENT)
Dept: INTERNAL MEDICINE | Facility: CLINIC | Age: 60
End: 2020-08-11

## 2020-08-11 VITALS
HEIGHT: 70 IN | DIASTOLIC BLOOD PRESSURE: 70 MMHG | RESPIRATION RATE: 18 BRPM | HEART RATE: 77 BPM | BODY MASS INDEX: 30.28 KG/M2 | WEIGHT: 211.5 LBS | TEMPERATURE: 98.9 F | OXYGEN SATURATION: 98 % | SYSTOLIC BLOOD PRESSURE: 130 MMHG

## 2020-08-11 DIAGNOSIS — Z87.891 PERSONAL HISTORY OF TOBACCO USE, PRESENTING HAZARDS TO HEALTH: ICD-10-CM

## 2020-08-11 DIAGNOSIS — Z12.2 ENCOUNTER FOR SCREENING FOR LUNG CANCER: ICD-10-CM

## 2020-08-11 DIAGNOSIS — Z00.00 ANNUAL PHYSICAL EXAM: Primary | ICD-10-CM

## 2020-08-11 DIAGNOSIS — Z79.4 CONTROLLED TYPE 2 DIABETES MELLITUS WITHOUT COMPLICATION, WITH LONG-TERM CURRENT USE OF INSULIN (HCC): ICD-10-CM

## 2020-08-11 DIAGNOSIS — I10 ESSENTIAL HYPERTENSION: ICD-10-CM

## 2020-08-11 DIAGNOSIS — L99 OTHER DISORDERS OF SKIN AND SUBCUTANEOUS TISSUE IN DISEASES CLASSIFIED ELSEWHERE: ICD-10-CM

## 2020-08-11 DIAGNOSIS — E78.00 PURE HYPERCHOLESTEROLEMIA: ICD-10-CM

## 2020-08-11 DIAGNOSIS — L57.0 ACTINIC KERATOSES: ICD-10-CM

## 2020-08-11 DIAGNOSIS — E11.9 CONTROLLED TYPE 2 DIABETES MELLITUS WITHOUT COMPLICATION, WITH LONG-TERM CURRENT USE OF INSULIN (HCC): ICD-10-CM

## 2020-08-11 LAB — HBA1C MFR BLD: 6 %

## 2020-08-11 PROCEDURE — 83036 HEMOGLOBIN GLYCOSYLATED A1C: CPT | Performed by: FAMILY MEDICINE

## 2020-08-11 PROCEDURE — 17000 DESTRUCT PREMALG LESION: CPT | Performed by: FAMILY MEDICINE

## 2020-08-11 PROCEDURE — 99386 PREV VISIT NEW AGE 40-64: CPT | Performed by: FAMILY MEDICINE

## 2020-08-11 PROCEDURE — G0296 VISIT TO DETERM LDCT ELIG: HCPCS | Performed by: FAMILY MEDICINE

## 2020-08-11 NOTE — PATIENT INSTRUCTIONS
Health Maintenance, Male  A healthy lifestyle and preventive care is important for your health and wellness. Ask your health care provider about what schedule of regular examinations is right for you.  What should I know about weight and diet?    Eat a Healthy Diet  · Eat plenty of vegetables, fruits, whole grains, low-fat dairy products, and lean protein.  · Do not eat a lot of foods high in solid fats, added sugars, or salt.     Maintain a Healthy Weight  Regular exercise can help you achieve or maintain a healthy weight. You should:  · Do at least 150 minutes of exercise each week. The exercise should increase your heart rate and make you sweat (moderate-intensity exercise).  · Do strength-training exercises at least twice a week.     Watch Your Levels of Cholesterol and Blood Lipids  · Have your blood tested for lipids and cholesterol every 5 years starting at 35 years of age. If you are at high risk for heart disease, you should start having your blood tested when you are 20 years old. You may need to have your cholesterol levels checked more often if:  ? Your lipid or cholesterol levels are high.  ? You are older than 50 years of age.  ? You are at high risk for heart disease.     What should I know about cancer screening?  Many types of cancers can be detected early and may often be prevented.  Lung Cancer  · You should be screened every year for lung cancer if:  ? You are a current smoker who has smoked for at least 30 years.  ? You are a former smoker who has quit within the past 15 years.  · Talk to your health care provider about your screening options, when you should start screening, and how often you should be screened.     Colorectal Cancer  · Routine colorectal cancer screening usually begins at 50 years of age and should be repeated every 5-10 years until you are 75 years old. You may need to be screened more often if early forms of precancerous polyps or small growths are found. Your health care  provider may recommend screening at an earlier age if you have risk factors for colon cancer.  · Your health care provider may recommend using home test kits to check for hidden blood in the stool.  · A small camera at the end of a tube can be used to examine your colon (sigmoidoscopy or colonoscopy). This checks for the earliest forms of colorectal cancer.     Prostate and Testicular Cancer  · Depending on your age and overall health, your health care provider may do certain tests to screen for prostate and testicular cancer.  · Talk to your health care provider about any symptoms or concerns you have about testicular or prostate cancer.     Skin Cancer  · Check your skin from head to toe regularly.  · Tell your health care provider about any new moles or changes in moles, especially if:  ? There is a change in a mole’s size, shape, or color.  ? You have a mole that is larger than a pencil eraser.  · Always use sunscreen. Apply sunscreen liberally and repeat throughout the day.  · Protect yourself by wearing long sleeves, pants, a wide-brimmed hat, and sunglasses when outside.     What should I know about heart disease, diabetes, and high blood pressure?  · If you are 18-39 years of age, have your blood pressure checked every 3-5 years. If you are 40 years of age or older, have your blood pressure checked every year. You should have your blood pressure measured twice--once when you are at a hospital or clinic, and once when you are not at a hospital or clinic. Record the average of the two measurements. To check your blood pressure when you are not at a hospital or clinic, you can use:  ? An automated blood pressure machine at a pharmacy.  ? A home blood pressure monitor.  · Talk to your health care provider about your target blood pressure.  · If you are between 45-79 years old, ask your health care provider if you should take aspirin to prevent heart disease.  · Have regular diabetes screenings by checking your  fasting blood sugar level.  ? If you are at a normal weight and have a low risk for diabetes, have this test once every three years after the age of 45.  ? If you are overweight and have a high risk for diabetes, consider being tested at a younger age or more often.  · A one-time screening for abdominal aortic aneurysm (AAA) by ultrasound is recommended for men aged 65-75 years who are current or former smokers.  What should I know about preventing infection?  Hepatitis B  If you have a higher risk for hepatitis B, you should be screened for this virus. Talk with your health care provider to find out if you are at risk for hepatitis B infection.  Hepatitis C  Blood testing is recommended for:  · Everyone born from 1945 through 1965.  · Anyone with known risk factors for hepatitis C.     Sexually Transmitted Diseases (STDs)  · You should be screened each year for STDs including gonorrhea and chlamydia if:  ? You are sexually active and are younger than 24 years of age.  ? You are older than 24 years of age and your health care provider tells you that you are at risk for this type of infection.  ? Your sexual activity has changed since you were last screened and you are at an increased risk for chlamydia or gonorrhea. Ask your health care provider if you are at risk.  · Talk with your health care provider about whether you are at high risk of being infected with HIV. Your health care provider may recommend a prescription medicine to help prevent HIV infection.     What else can I do?  ·   · Schedule regular health, dental, and eye exams.  · Stay current with your vaccines (immunizations).  · Do not use any tobacco products, such as cigarettes, chewing tobacco, and e-cigarettes. If you need help quitting, ask your health care provider.  · Limit alcohol intake to no more than 2 drinks per day. One drink equals 12 ounces of beer, 5 ounces of wine, or 1½ ounces of hard liquor.  · Do not use street drugs.  · Do not share  needles.  · Ask your health care provider for help if you need support or information about quitting drugs.  · Tell your health care provider if you often feel depressed.  · Tell your health care provider if you have ever been abused or do not feel safe at home.      This information is not intended to replace advice given to you by your health care provider. Make sure you discuss any questions you have with your health care provider.  Document Released: 06/15/2009 Document Revised: 08/16/2017 Document Reviewed: 09/20/2016  [employer] Interactive Patient Education © 2018 [employer]       Diabetes Mellitus and Standards of Medical Care  Managing diabetes (diabetes mellitus) can be complicated. Your diabetes treatment may be managed by a team of health care providers, including:  · A physician who specializes in diabetes (endocrinologist).  · A nurse practitioner or physician assistant.  · Nurses.  · A diet and nutrition specialist (registered dietitian).  · A certified diabetes educator (CDE).  · An exercise specialist.  · A pharmacist.  · An eye doctor.  · A foot specialist (podiatrist).  · A dentist.  · A primary care provider.  · A mental health provider.     Your health care providers follow guidelines to help you get the best quality of care. The following schedule is a general guideline for your diabetes management plan. Your health care providers may give you more specific instructions.  Physical exams  Upon being diagnosed with diabetes mellitus, and each year after that, your health care provider will ask about your medical and family history. He or she will also do a physical exam. Your exam may include:  · Measuring your height, weight, and body mass index (BMI).  · Checking your blood pressure. This will be done at every routine medical visit. Your target blood pressure may vary depending on your medical conditions, your age, and other factors.  · Thyroid gland exam.  · Skin exam.  · Screening for damage  to your nerves (peripheral neuropathy). This may include checking the pulse in your legs and feet and checking the level of sensation in your hands and feet.  · A complete foot exam to inspect the structure and skin of your feet, including checking for cuts, bruises, redness, blisters, sores, or other problems.  · Screening for blood vessel (vascular) problems, which may include checking the pulse in your legs and feet and checking your temperature.     Blood tests  Depending on your treatment plan and your personal needs, you may have the following tests done:  · HbA1c (hemoglobin A1c). This test provides information about blood sugar (glucose) control over the previous 2-3 months. It is used to adjust your treatment plan, if needed. This test will be done:  ? At least 2 times a year, if you are meeting your treatment goals.  ? 4 times a year, if you are not meeting your treatment goals or if treatment goals have changed.  · Lipid testing, including total, LDL, and HDL cholesterol and triglyceride levels.  ? The goal for LDL is less than 100 mg/dL (5.5 mmol/L). If you are at high risk for complications, the goal is less than 70 mg/dL (3.9 mmol/L).  ? The goal for HDL is 40 mg/dL (2.2 mmol/L) or higher for men and 50 mg/dL(2.8 mmol/L) or higher for women. An HDL cholesterol of 60 mg/dL (3.3 mmol/L) or higher gives some protection against heart disease.  ? The goal for triglycerides is less than 150 mg/dL (8.3 mmol/L).  · Liver function tests.  · Kidney function tests.  · Thyroid function tests.     Dental and eye exams  · Visit your dentist two times a year.  · If you have type 1 diabetes, your health care provider may recommend an eye exam 3-5 years after you are diagnosed, and then once a year after your first exam.  ? For children with type 1 diabetes, a health care provider may recommend an eye exam when your child is age 10 or older and has had diabetes for 3-5 years. After the first exam, your child should get  an eye exam once a year.  · If you have type 2 diabetes, your health care provider may recommend an eye exam as soon as you are diagnosed, and then once a year after your first exam.  Immunizations  · The yearly flu (influenza) vaccine is recommended for everyone 6 months or older who has diabetes.  · The pneumonia (pneumococcal) vaccine is recommended for everyone 2 years or older who has diabetes. If you are 65 or older, you may get the pneumonia vaccine as a series of two separate shots.  · The hepatitis B vaccine is recommended for adults shortly after being diagnosed with diabetes.  ·   · Adults and children with diabetes should receive all other vaccines according to age-specific recommendations from the Centers for Disease Control and Prevention (CDC).  Mental and emotional health  Screening for symptoms of eating disorders, anxiety, and depression is recommended at the time of diagnosis and afterward as needed. If your screening shows that you have symptoms (positive screening result), you may need more evaluation and you may work with a mental health care provider.  Treatment plan  Your treatment plan will be reviewed at every medical visit. You and your health care provider will discuss:  · How you are taking your medicines, including insulin.  · Any side effects you are experiencing.  · Your blood glucose target goals.  · The frequency of your blood glucose monitoring.  · Lifestyle habits, such as activity level as well as tobacco, alcohol, and substance use.     Diabetes self-management education  Your health care provider will assess how well you are monitoring your blood glucose levels and whether you are taking your insulin correctly. He or she may refer you to:  · A certified diabetes educator to manage your diabetes throughout your life, starting at diagnosis.  · A registered dietitian who can create or review your personal nutrition plan.  · An exercise specialist who can discuss your activity  level and exercise plan.     Summary  · Managing diabetes (diabetes mellitus) can be complicated. Your diabetes treatment may be managed by a team of health care providers.  · Your health care providers follow guidelines in order to help you get the best quality of care.  · Standards of care including having regular physical exams, blood tests, blood pressure monitoring, immunizations, screening tests, and education about how to manage your diabetes.  · Your health care providers may also give you more specific instructions based on your individual health.  This information is not intended to replace advice given to you by your health care provider. Make sure you discuss any questions you have with your health care provider.  Document Released: 10/15/2010 Document Revised: 06/28/2018 Document Reviewed: 09/15/2017  [employer] Interactive Patient Education © 2019 [employer]

## 2020-08-11 NOTE — PROGRESS NOTES
"08/11/2020  Chief Complaint   Patient presents with   • Annual Exam     Physical       Wale Aguila is here for his annual preventive exam. History per MA reviewed.       Wale Aguila has the following medical issues:  Patient Active Problem List    Diagnosis   • Controlled type 2 diabetes mellitus without complication, with long-term current use of insulin (CMS/[facility]) [E11.9, Z79.4]   • Essential hypertension [I10]   • Pure hypercholesterolemia [E78.00]       Health Maintenance   Topic Date Due   • LUNG CANCER SCREENING  06/24/2017   • ANNUAL PHYSICAL  03/30/2020   • HEMOGLOBIN A1C  04/04/2020   • INFLUENZA VACCINE  08/01/2020   • ZOSTER VACCINE (2 of 3) 08/20/2021 (Originally 5/15/2017)   • LIPID PANEL  10/04/2020   • URINE MICROALBUMIN  10/04/2020   • DIABETIC EYE EXAM  08/01/2021   • DIABETIC FOOT EXAM  08/11/2021   • COLONOSCOPY  01/01/2025   • TDAP/TD VACCINES (2 - Td) 10/01/2027   • PNEUMOCOCCAL VACCINE (19-64 MEDIUM RISK)  Completed   • HEPATITIS C SCREENING  Completed       Immunization History   Administered Date(s) Administered   • Flu Mist 10/01/2017   • Flucelvax Quad Vial =>4yrs 09/28/2018   • Pneumococcal Polysaccharide (PPSV23) 11/12/2012   • Td 10/01/2015   • Tdap 10/01/2017   • Zostavax 03/20/2017   • flucelvax quad pfs =>4 YRS 10/01/2019       Review of Systems   Constitutional: Negative for fever.   Respiratory: Negative for shortness of breath.    Cardiovascular: Negative for chest pain.   Gastrointestinal: Negative for abdominal pain.   Skin: Positive for skin lesions.   All other systems reviewed and are negative.      The following portions of the patient's history were reviewed and updated as appropriate: allergies, current medications, past family history, past medical history, past social history, past surgical history and problem list.    Objective   Visit Vitals  /70   Pulse 77   Temp 98.9 °F (37.2 °C) (Temporal)   Resp 18   Ht 177.8 cm (70\")   Wt 95.9 kg (211 lb 8 oz)   SpO2 98% "   BMI 30.35 kg/m²          Physical Exam   Constitutional: He is oriented to person, place, and time. Vital signs are normal. He appears well-developed and well-nourished. He is active.  Non-toxic appearance. He does not have a sickly appearance. He does not appear ill. No distress. Face mask in place. He is obese.  HENT:   Head: Normocephalic and atraumatic. 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       Right Ear: Hearing, tympanic membrane, external ear and ear canal normal.   Left Ear: Hearing, tympanic membrane, external ear and ear canal normal.   Eyes: Pupils are equal, round, and reactive to light. Conjunctivae, EOM and lids are normal. Right eye exhibits no discharge. Left eye exhibits no discharge. No scleral icterus.   Neck: Phonation normal. Neck supple. No thyromegaly present.   Cardiovascular: Normal rate, regular rhythm and normal heart sounds.   Pulmonary/Chest: Effort normal and breath sounds normal.   Abdominal: Soft. He exhibits no distension and no mass. There is no tenderness. There is no rigidity, no rebound and no guarding.   Musculoskeletal: He exhibits no edema or deformity.   Neurological: He is alert and oriented to person, place, and time. He displays no tremor. Gait normal.   Skin: Skin is warm. Capillary refill takes less than 2 seconds. Turgor is normal. He is not diaphoretic. No cyanosis. No pallor.   Psychiatric: He has a normal mood and affect. His speech is normal and behavior is normal. Judgment and thought content normal. Cognition and memory are normal.   Nursing note and vitals reviewed.      Office Visit on 08/11/2020   Component Date Value Ref Range Status   • Hemoglobin A1C 08/11/2020 6.0  % Final         Lab Results   Component Value Date    CHLPL 113 10/04/2019    TRIG 283 (H) 10/04/2019    HDL 31 (L) 10/04/2019    LDL 25 10/04/2019     Lab Results   Component Value Date    HGBA1C 5.50 10/04/2019    HGBA1C 5.60 03/29/2019    HGBA1C 5.40 09/28/2018     Cryotherapy, Skin  Lesion  Date/Time: 8/11/2020 10:10 AM  Performed by: Naya Cerda MD  Authorized by: Naya Cerda MD   Consent: Verbal consent obtained. Written consent not obtained.  Risks and benefits: risks, benefits and alternatives were discussed  Consent given by: patient  Patient understanding: patient states understanding of the procedure being performed  Required items: required blood products, implants, devices, and special equipment available  Patient identity confirmed: verbally with patient  Local anesthesia used: no    Anesthesia:  Local anesthesia used: no    Sedation:  Patient sedated: no    Patient tolerance: Patient tolerated the procedure well with no immediate complications  Comments: Cryotherapy applied to lesion x 3 sprays with adequate freeze each application. After care instructions given.          Assessment     Problem List Items Addressed This Visit        Cardiovascular and Mediastinum    Essential hypertension    Pure hypercholesterolemia       Endocrine    Controlled type 2 diabetes mellitus without complication, with long-term current use of insulin (CMS/HCC)    Relevant Orders    POC Glycosylated Hemoglobin (Hb A1C) (Completed)      Other Visit Diagnoses     Annual physical exam    -  Primary    Personal history of tobacco use, presenting hazards to health        Relevant Orders    CT Chest Low Dose Wo    Encounter for screening for lung cancer        Relevant Orders    CT Chest Low Dose Wo    Actinic keratoses        Other disorders of skin and subcutaneous tissue in diseases classified elsewhere              · Health maintenance information provided with patient plan.   · Counseled on age appropriate health screenings.  · Risks and benefits of lung cancer screening discussed. Shared decision making employed in making decision to proceed with lung cancer screening. Patient is aware further testing may be needed and that CT scans may occur yearly. Another risk is radiation exposure,  cumulative over time, though low levels.  At this time the patient has no signs of lung cancer.  · Encourage healthy habits such as exercise, healthy diet.  Patient's Body mass index is 30.35 kg/m². BMI is above normal parameters. Recommendations include: educational material.  · Discussed fasting labs next visit for hyperlipidemia; monitor blood pressure, goal <130/80    Naya Cerda MD

## 2020-08-26 DIAGNOSIS — I10 ESSENTIAL HYPERTENSION: ICD-10-CM

## 2020-08-27 RX ORDER — FLUTICASONE PROPIONATE 50 MCG
SPRAY, SUSPENSION (ML) NASAL
Qty: 48 G | Refills: 3 | Status: SHIPPED | OUTPATIENT
Start: 2020-08-27 | End: 2022-01-02

## 2020-08-27 RX ORDER — SILDENAFIL 50 MG/1
TABLET, FILM COATED ORAL
Qty: 30 TABLET | Refills: 3 | Status: SHIPPED | OUTPATIENT
Start: 2020-08-27 | End: 2021-11-05

## 2020-08-27 RX ORDER — LISINOPRIL 10 MG/1
TABLET ORAL
Qty: 90 TABLET | Refills: 3 | Status: SHIPPED | OUTPATIENT
Start: 2020-08-27 | End: 2021-11-05

## 2020-09-03 ENCOUNTER — APPOINTMENT (OUTPATIENT)
Dept: CT IMAGING | Facility: HOSPITAL | Age: 60
End: 2020-09-03

## 2020-09-08 ENCOUNTER — APPOINTMENT (OUTPATIENT)
Dept: CT IMAGING | Facility: HOSPITAL | Age: 60
End: 2020-09-08

## 2020-10-04 DIAGNOSIS — E78.00 PURE HYPERCHOLESTEROLEMIA: ICD-10-CM

## 2020-10-05 RX ORDER — ATORVASTATIN CALCIUM 40 MG/1
TABLET, FILM COATED ORAL
Qty: 90 TABLET | Refills: 1 | Status: SHIPPED | OUTPATIENT
Start: 2020-10-05 | End: 2021-04-05

## 2020-11-30 RX ORDER — LEVOCETIRIZINE DIHYDROCHLORIDE 5 MG/1
TABLET, FILM COATED ORAL
Qty: 90 TABLET | Refills: 3 | Status: SHIPPED | OUTPATIENT
Start: 2020-11-30 | End: 2021-12-06

## 2021-04-04 DIAGNOSIS — E78.00 PURE HYPERCHOLESTEROLEMIA: ICD-10-CM

## 2021-04-05 RX ORDER — ATORVASTATIN CALCIUM 40 MG/1
40 TABLET, FILM COATED ORAL NIGHTLY
Qty: 90 TABLET | Refills: 0 | Status: SHIPPED | OUTPATIENT
Start: 2021-04-05 | End: 2021-06-22

## 2021-04-15 ENCOUNTER — OFFICE VISIT (OUTPATIENT)
Dept: INTERNAL MEDICINE | Facility: CLINIC | Age: 61
End: 2021-04-15

## 2021-04-15 VITALS
HEART RATE: 73 BPM | DIASTOLIC BLOOD PRESSURE: 74 MMHG | SYSTOLIC BLOOD PRESSURE: 134 MMHG | HEIGHT: 70 IN | WEIGHT: 211 LBS | RESPIRATION RATE: 17 BRPM | BODY MASS INDEX: 30.21 KG/M2 | TEMPERATURE: 97.1 F | OXYGEN SATURATION: 100 %

## 2021-04-15 DIAGNOSIS — Z51.81 MEDICATION MONITORING ENCOUNTER: ICD-10-CM

## 2021-04-15 DIAGNOSIS — E11.59 TYPE 2 DIABETES MELLITUS WITH OTHER CIRCULATORY COMPLICATION, WITHOUT LONG-TERM CURRENT USE OF INSULIN (HCC): Primary | ICD-10-CM

## 2021-04-15 DIAGNOSIS — E78.00 PURE HYPERCHOLESTEROLEMIA: ICD-10-CM

## 2021-04-15 DIAGNOSIS — Z12.5 PROSTATE CANCER SCREENING: ICD-10-CM

## 2021-04-15 DIAGNOSIS — I10 ESSENTIAL HYPERTENSION: ICD-10-CM

## 2021-04-15 PROBLEM — E11.9 CONTROLLED TYPE 2 DIABETES MELLITUS WITHOUT COMPLICATION, WITH LONG-TERM CURRENT USE OF INSULIN (HCC): Status: RESOLVED | Noted: 2018-09-28 | Resolved: 2021-04-15

## 2021-04-15 PROBLEM — E11.9 DIABETES MELLITUS: Status: ACTIVE | Noted: 2021-04-15

## 2021-04-15 PROBLEM — Z79.4 CONTROLLED TYPE 2 DIABETES MELLITUS WITHOUT COMPLICATION, WITH LONG-TERM CURRENT USE OF INSULIN: Status: RESOLVED | Noted: 2018-09-28 | Resolved: 2021-04-15

## 2021-04-15 LAB
HBA1C MFR BLD: 5.7 %
POC CREATININE URINE: NORMAL
POC MICROALBUMIN URINE: NORMAL

## 2021-04-15 PROCEDURE — 83036 HEMOGLOBIN GLYCOSYLATED A1C: CPT | Performed by: FAMILY MEDICINE

## 2021-04-15 PROCEDURE — 82044 UR ALBUMIN SEMIQUANTITATIVE: CPT | Performed by: FAMILY MEDICINE

## 2021-04-15 PROCEDURE — 99214 OFFICE O/P EST MOD 30 MIN: CPT | Performed by: FAMILY MEDICINE

## 2021-04-15 NOTE — PROGRESS NOTES
"Subjective    Wale Aguila is a 60 y.o. male here for:  Chief Complaint   Patient presents with   • Diabetes     6 month follow up-A1c check   Answers for HPI/ROS submitted by the patient on 4/13/2021  What is the primary reason for your visit?: Other  Please describe your symptoms.: Routine appt, no issues.  Have you had these symptoms before?: No  How long have you been having these symptoms?: Greater than 2 weeks        · History per MA reviewed.    Under stress, work. Wife has rods in back due to severe scoliosis, broken law recently and has surgery coming up. Dog also required surgery recently.    Not fasting this AM, drank coffee with cream.    No issues with medicines at this time.    Diabetes  He presents for his follow-up diabetic visit. He has type 2 diabetes mellitus. Pertinent negatives for diabetes include no weight loss. Current diabetic treatment includes oral agent (monotherapy).   Hypertension  This is a chronic problem. Current antihypertension treatment includes ACE inhibitors.   Hyperlipidemia  This is a chronic problem. Exacerbating diseases include diabetes. Current antihyperlipidemic treatment includes statins. Risk factors for coronary artery disease include dyslipidemia, diabetes mellitus, hypertension and male sex.          The following portions of the patient's history were reviewed and updated as appropriate: allergies, current medications, past family history, past medical history, past social history, past surgical history and problem list.    Review of Systems   Constitutional: Negative for fever and unexpected weight loss.   Allergic/Immunologic: Positive for environmental allergies.   Psychiatric/Behavioral: Positive for stress.       Visit Vitals  /74   Pulse 73   Temp 97.1 °F (36.2 °C)   Resp 17   Ht 177.8 cm (70\")   Wt 95.7 kg (211 lb)   SpO2 100%   BMI 30.28 kg/m²         Objective   Physical Exam  Vitals and nursing note reviewed.   Constitutional:       General: He is " not in acute distress.     Appearance: Normal appearance. He is well-developed and well-groomed. He is obese. He is not ill-appearing, toxic-appearing or diaphoretic.      Interventions: Face mask in place.   HENT:      Head: Normocephalic and atraumatic.      Right Ear: Hearing normal.      Left Ear: Hearing normal.   Eyes:      General: Lids are normal. No scleral icterus.     Extraocular Movements: Extraocular movements intact.   Cardiovascular:      Rate and Rhythm: Normal rate and regular rhythm.   Pulmonary:      Effort: Pulmonary effort is normal.      Breath sounds: Normal breath sounds. Transmitted upper airway sounds present. No wheezing, rhonchi or rales.   Skin:     General: Skin is warm.      Coloration: Skin is not ashen or jaundiced.   Neurological:      General: No focal deficit present.      Mental Status: He is alert and oriented to person, place, and time.   Psychiatric:         Attention and Perception: Attention and perception normal.         Mood and Affect: Mood and affect normal.         Speech: Speech normal.         Behavior: Behavior normal. Behavior is cooperative.         Thought Content: Thought content normal.         Cognition and Memory: Cognition and memory normal.         Judgment: Judgment normal.         For medical decision making review of the following was required:  Lab Results   Component Value Date    HGBA1C 5.7 04/15/2021    HGBA1C 6.0 08/11/2020    HGBA1C 5.50 10/04/2019         Assessment/Plan     Problem List Items Addressed This Visit        Cardiac and Vasculature    Essential hypertension    Relevant Orders    CBC (No Diff)    Pure hypercholesterolemia    Relevant Orders    Comprehensive Metabolic Panel    Lipid Panel       Endocrine and Metabolic    Diabetes mellitus (CMS/HCC) - Primary    Relevant Orders    POC Glycosylated Hemoglobin (Hb A1C) (Completed)    POC Microalbumin (Completed)    Comprehensive Metabolic Panel    Vitamin B12    Folate      Other Visit  Diagnoses     Medication monitoring encounter        Relevant Orders    Vitamin B12    Folate    CBC (No Diff)    Prostate cancer screening        Relevant Orders    PSA Screen          · Due to metformin need to monitor vitamin B12 over time. Return for labs (fasting) when possible. Other than quite a bit of stress life is throwing at him he seems to be doing well. No med changes today. Continue metformin, no side effects. I asked him to let us know if he has any needs between visits.     Return in about 6 months (around 10/15/2021) for Annual physical.     Naya Cerda MD

## 2021-04-15 NOTE — PATIENT INSTRUCTIONS
Mediterranean Diet  A Mediterranean diet refers to food and lifestyle choices that are based on the traditions of countries located on the [place]. This way of eating has been shown to help prevent certain conditions and improve outcomes for people who have chronic diseases, like kidney disease and heart disease.  What are tips for following this plan?  Lifestyle  · Cook and eat meals together with your family, when possible.  · Drink enough fluid to keep your urine clear or pale yellow.  · Be physically active every day. This includes:  ? Aerobic exercise like running or swimming.  ? Leisure activities like gardening, walking, or housework.  · Get 7-8 hours of sleep each night.  · If recommended by your health care provider, drink red wine in moderation. This means 1 glass a day for nonpregnant women and 2 glasses a day for men. A glass of wine equals 5 oz (150 mL).  Reading food labels    · Check the serving size of packaged foods. For foods such as rice and pasta, the serving size refers to the amount of cooked product, not dry.  · Check the total fat in packaged foods. Avoid foods that have saturated fat or trans fats.  · Check the ingredients list for added sugars, such as corn syrup.  Shopping  · At the grocery store, buy most of your food from the areas near the walls of the store. This includes:  ? Fresh fruits and vegetables (produce).  ? Grains, beans, nuts, and seeds. Some of these may be available in unpackaged forms or large amounts (in bulk).  ? Fresh seafood.  ? Poultry and eggs.  ? Low-fat dairy products.  · Buy whole ingredients instead of prepackaged foods.  · Buy fresh fruits and vegetables in-season from local farmers markets.  · Buy frozen fruits and vegetables in resealable bags.  · If you do not have access to quality fresh seafood, buy precooked frozen shrimp or canned fish, such as tuna, salmon, or sardines.  · Buy small amounts of raw or cooked vegetables, salads, or olives from  the deli or salad bar at your store.  · Stock your pantry so you always have certain foods on hand, such as olive oil, canned tuna, canned tomatoes, rice, pasta, and beans.  Cooking  · Cook foods with extra-virgin olive oil instead of using butter or other vegetable oils.  · Have meat as a side dish, and have vegetables or grains as your main dish. This means having meat in small portions or adding small amounts of meat to foods like pasta or stew.  · Use beans or vegetables instead of meat in common dishes like chili or lasagna.  · [place] with different cooking methods. Try roasting or broiling vegetables instead of steaming or sautéeing them.  · Add frozen vegetables to soups, stews, pasta, or rice.  · Add nuts or seeds for added healthy fat at each meal. You can add these to yogurt, salads, or vegetable dishes.  · Marinate fish or vegetables using olive oil, lemon juice, garlic, and fresh herbs.  Meal planning    · Plan to eat 1 vegetarian meal one day each week. Try to work up to 2 vegetarian meals, if possible.  · Eat seafood 2 or more times a week.  · Have healthy snacks readily available, such as:  ? Vegetable sticks with hummus.  ? Greek yogurt.  ? Fruit and nut trail mix.  · Eat balanced meals throughout the week. This includes:  ? Fruit: 2-3 servings a day  ? Vegetables: 4-5 servings a day  ? Low-fat dairy: 2 servings a day  ? Fish, poultry, or lean meat: 1 serving a day  ? Beans and legumes: 2 or more servings a week  ? Nuts and seeds: 1-2 servings a day  ? Whole grains: 6-8 servings a day  ? Extra-virgin olive oil: 3-4 servings a day  · Limit red meat and sweets to only a few servings a month  What are my food choices?  · Mediterranean diet  ? Recommended  § Grains: Whole-grain pasta. Brown rice. Bulgar wheat. Polenta. Couscous. Whole-wheat bread. Oatmeal. Quinoa.  § Vegetables: Artichokes. Beets. Broccoli. Cabbage. Carrots. Eggplant. Green beans. Chard. Kale. Spinach. Onions. Leeks. Peas. Squash.  Tomatoes. Peppers. Radishes.  § Fruits: Apples. Apricots. Avocado. Berries. Bananas. Cherries. Dates. Figs. Grapes. [place]. Melon. Oranges. Peaches. Plums. Pomegranate.  § Meats and other protein foods: Beans. Almonds. Sunflower seeds. Pine nuts. Peanuts. Cod. [place]. Scallops. Shrimp. Tuna. Tilapia. Clams. Oysters. Eggs.  § Dairy: Low-fat milk. Cheese. Greek yogurt.  § Beverages: Water. Red wine. Herbal tea.  § Fats and oils: Extra virgin olive oil. Avocado oil. Grape seed oil.  § Sweets and desserts: Greek yogurt with honey. Baked apples. Poached pears. Trail mix.  § Seasoning and other foods: Basil. Cilantro. Coriander. Cumin. Mint. Parsley. [place]. Rosemary. Tarragon. Garlic. Oregano. Thyme. Pepper. Balsalmic vinegar. Tahini. Hummus. Tomato sauce. Olives. Mushrooms.  ? Limit these  § Grains: Prepackaged pasta or rice dishes. Prepackaged cereal with added sugar.  § Vegetables: Deep fried potatoes (french fries).  § Fruits: Fruit canned in syrup.  § Meats and other protein foods: Beef. Pork. Lamb. Poultry with skin. Hot dogs. Rich.  § Dairy: Ice cream. Sour cream. Whole milk.  § Beverages: Juice. Sugar-sweetened soft drinks. Beer. Liquor and spirits.  § Fats and oils: Butter. Canola oil. Vegetable oil. Beef fat (tallow). Lard.  § Sweets and desserts: Cookies. Cakes. Pies. Candy.  § Seasoning and other foods: Mayonnaise. Premade sauces and marinades.  The items listed may not be a complete list. Talk with your dietitian about what dietary choices are right for you.  Summary  · The Mediterranean diet includes both food and lifestyle choices.  · Eat a variety of fresh fruits and vegetables, beans, nuts, seeds, and whole grains.  · Limit the amount of red meat and sweets that you eat.  · Talk with your health care provider about whether it is safe for you to drink red wine in moderation. This means 1 glass a day for nonpregnant women and 2 glasses a day for men. A glass of wine equals 5 oz (150 mL).  This information  is not intended to replace advice given to you by your health care provider. Make sure you discuss any questions you have with your health care provider.  Document Revised: 08/17/2017 Document Reviewed: 08/10/2017  Elsevier Patient Education © 2020 [employer]

## 2021-06-21 DIAGNOSIS — E78.00 PURE HYPERCHOLESTEROLEMIA: ICD-10-CM

## 2021-06-22 RX ORDER — ATORVASTATIN CALCIUM 40 MG/1
TABLET, FILM COATED ORAL
Qty: 90 TABLET | Refills: 3 | Status: SHIPPED | OUTPATIENT
Start: 2021-06-22 | End: 2022-03-21

## 2021-10-14 ENCOUNTER — TELEPHONE (OUTPATIENT)
Dept: INTERNAL MEDICINE | Facility: CLINIC | Age: 61
End: 2021-10-14

## 2021-10-14 NOTE — TELEPHONE ENCOUNTER
Patient did not complete CT ordered on 08/11/2020. This order is too old to be used. May I cancel the order?

## 2021-11-04 DIAGNOSIS — I10 ESSENTIAL HYPERTENSION: ICD-10-CM

## 2021-11-05 RX ORDER — SILDENAFIL 50 MG/1
TABLET, FILM COATED ORAL
Qty: 30 TABLET | Refills: 0 | Status: SHIPPED | OUTPATIENT
Start: 2021-11-05 | End: 2022-01-26

## 2021-11-05 RX ORDER — LISINOPRIL 10 MG/1
TABLET ORAL
Qty: 90 TABLET | Refills: 0 | Status: SHIPPED | OUTPATIENT
Start: 2021-11-05 | End: 2021-12-08 | Stop reason: SDUPTHER

## 2021-11-05 NOTE — TELEPHONE ENCOUNTER
Rx Refill Note  Requested Prescriptions     Pending Prescriptions Disp Refills   • sildenafil (VIAGRA) 50 MG tablet [Pharmacy Med Name: SILDENAFIL TABS 50MG] 30 tablet 3     Sig: TAKE 1 TABLET UP TO ONCE DAILY ONE-HALF (1/2) HOUR TO 4 HOURS BEFORE NEEDED   • lisinopril (PRINIVIL,ZESTRIL) 10 MG tablet [Pharmacy Med Name: LISINOPRIL TABS 10MG] 90 tablet 3     Sig: TAKE 1 TABLET DAILY (APPOINTMENT NEEDED FOR ADDITIONAL REFILLS)      Last office visit with prescribing clinician: 4/15/2021      Next office visit with prescribing clinician: 12/8/2021            Yaneth Pineda LPN  11/05/21, 12:56 EDT

## 2021-11-22 LAB
ALBUMIN SERPL-MCNC: 4.6 G/DL (ref 3.5–5.2)
ALBUMIN/GLOB SERPL: 2.3 G/DL
ALP SERPL-CCNC: 111 U/L (ref 39–117)
ALT SERPL-CCNC: 34 U/L (ref 1–41)
AST SERPL-CCNC: 17 U/L (ref 1–40)
BILIRUB SERPL-MCNC: 0.4 MG/DL (ref 0–1.2)
BUN SERPL-MCNC: 10 MG/DL (ref 6–20)
BUN/CREAT SERPL: 8.5 (ref 7–25)
CALCIUM SERPL-MCNC: 9.7 MG/DL (ref 8.6–10.5)
CHLORIDE SERPL-SCNC: 103 MMOL/L (ref 98–107)
CHOLEST SERPL-MCNC: 105 MG/DL (ref 0–200)
CO2 SERPL-SCNC: 26.7 MMOL/L (ref 22–29)
CREAT SERPL-MCNC: 1.18 MG/DL (ref 0.76–1.27)
ERYTHROCYTE [DISTWIDTH] IN BLOOD BY AUTOMATED COUNT: 12.4 % (ref 12.3–15.4)
FOLATE SERPL-MCNC: >20 NG/ML (ref 4.78–24.2)
GLOBULIN SER CALC-MCNC: 2 GM/DL
GLUCOSE SERPL-MCNC: 133 MG/DL (ref 65–99)
HCT VFR BLD AUTO: 42.6 % (ref 37.5–51)
HDLC SERPL-MCNC: 33 MG/DL (ref 40–60)
HGB BLD-MCNC: 15.1 G/DL (ref 13–17.7)
LDLC SERPL CALC-MCNC: 30 MG/DL (ref 0–100)
MCH RBC QN AUTO: 33.6 PG (ref 26.6–33)
MCHC RBC AUTO-ENTMCNC: 35.4 G/DL (ref 31.5–35.7)
MCV RBC AUTO: 94.7 FL (ref 79–97)
PLATELET # BLD AUTO: 222 10*3/MM3 (ref 140–450)
POTASSIUM SERPL-SCNC: 4.5 MMOL/L (ref 3.5–5.2)
PROT SERPL-MCNC: 6.6 G/DL (ref 6–8.5)
PSA SERPL-MCNC: 1.23 NG/ML (ref 0–4)
RBC # BLD AUTO: 4.5 10*6/MM3 (ref 4.14–5.8)
SODIUM SERPL-SCNC: 139 MMOL/L (ref 136–145)
TRIGL SERPL-MCNC: 283 MG/DL (ref 0–150)
VIT B12 SERPL-MCNC: 377 PG/ML (ref 211–946)
VLDLC SERPL CALC-MCNC: 42 MG/DL (ref 5–40)
WBC # BLD AUTO: 6.47 10*3/MM3 (ref 3.4–10.8)

## 2021-12-06 RX ORDER — LEVOCETIRIZINE DIHYDROCHLORIDE 5 MG/1
TABLET, FILM COATED ORAL
Qty: 90 TABLET | Refills: 3 | Status: SHIPPED | OUTPATIENT
Start: 2021-12-06

## 2021-12-08 ENCOUNTER — OFFICE VISIT (OUTPATIENT)
Dept: INTERNAL MEDICINE | Facility: CLINIC | Age: 61
End: 2021-12-08

## 2021-12-08 VITALS
SYSTOLIC BLOOD PRESSURE: 122 MMHG | HEIGHT: 70 IN | WEIGHT: 214.4 LBS | DIASTOLIC BLOOD PRESSURE: 78 MMHG | OXYGEN SATURATION: 99 % | TEMPERATURE: 97.8 F | HEART RATE: 69 BPM | BODY MASS INDEX: 30.69 KG/M2 | RESPIRATION RATE: 16 BRPM

## 2021-12-08 DIAGNOSIS — I10 ESSENTIAL HYPERTENSION: ICD-10-CM

## 2021-12-08 DIAGNOSIS — E11.59 TYPE 2 DIABETES MELLITUS WITH OTHER CIRCULATORY COMPLICATION, WITHOUT LONG-TERM CURRENT USE OF INSULIN (HCC): ICD-10-CM

## 2021-12-08 DIAGNOSIS — Z87.891 PERSONAL HISTORY OF TOBACCO USE, PRESENTING HAZARDS TO HEALTH: ICD-10-CM

## 2021-12-08 DIAGNOSIS — Z00.00 ANNUAL PHYSICAL EXAM: Primary | ICD-10-CM

## 2021-12-08 DIAGNOSIS — Z12.2 SCREENING FOR LUNG CANCER: ICD-10-CM

## 2021-12-08 LAB
EXPIRATION DATE: NORMAL
HBA1C MFR BLD: 6.3 %
Lab: NORMAL

## 2021-12-08 PROCEDURE — 99396 PREV VISIT EST AGE 40-64: CPT | Performed by: FAMILY MEDICINE

## 2021-12-08 PROCEDURE — 83036 HEMOGLOBIN GLYCOSYLATED A1C: CPT | Performed by: FAMILY MEDICINE

## 2021-12-08 RX ORDER — LISINOPRIL 10 MG/1
10 TABLET ORAL NIGHTLY
Qty: 90 TABLET | Refills: 3 | Status: SHIPPED | OUTPATIENT
Start: 2021-12-08 | End: 2023-02-23

## 2021-12-08 NOTE — PROGRESS NOTES
12/08/2021  Chief Complaint   Patient presents with   • Annual Exam     annual physical       Wale Aguila is here for his annual preventive exam. History per MA reviewed.         Wale Aguila has the following medical issues:  Patient Active Problem List    Diagnosis    • Diabetes mellitus (HCC) [E11.9]    • Essential hypertension [I10]    • Pure hypercholesterolemia [E78.00]        Health Maintenance   Topic Date Due   • LUNG CANCER SCREENING  Never done   • ANNUAL PHYSICAL  08/12/2021   • URINE MICROALBUMIN  04/15/2022   • HEMOGLOBIN A1C  06/08/2022   • LIPID PANEL  11/22/2022   • DIABETIC FOOT EXAM  12/08/2022   • DIABETIC EYE EXAM  12/08/2022   • COLORECTAL CANCER SCREENING  01/01/2025   • Pneumococcal Vaccine 0-64 (2 of 2 - PPSV23) 06/24/2027   • TDAP/TD VACCINES (4 - Td or Tdap) 10/01/2027   • HEPATITIS C SCREENING  Completed   • COVID-19 Vaccine  Completed   • INFLUENZA VACCINE  Completed   • ZOSTER VACCINE  Completed   • Hepatitis B  Discontinued       Immunization History   Administered Date(s) Administered   • COVID-19 (MODERNA) 1st, 2nd, 3rd Dose Only 02/22/2021, 03/22/2021, 11/20/2021   • Flu Vaccine Quad PF >36MO 09/11/2021   • FluLaval/Fluarix/Fluzone >6 10/07/2020   • FluMist 2-49yrs 10/01/2017   • Flucelvax Quad Vial =>4yrs 09/28/2018   • Influenza TIV (IM) 10/01/2017   • Influenza, Unspecified 10/26/2010, 09/06/2011   • Pneumococcal Polysaccharide (PPSV23) 11/12/2012   • Shingrix 10/07/2020, 01/17/2021   • Td 10/01/2015   • Tdap 11/12/2012, 10/01/2017   • Zostavax 03/20/2017   • flucelvax quad pfs =>4 YRS 10/01/2019       Review of Systems   Constitutional: Negative for fever.   Respiratory: Negative for shortness of breath.    Cardiovascular: Negative for chest pain.   Gastrointestinal: Negative for abdominal pain.   Psychiatric/Behavioral:        Stress--divorce   All other systems reviewed and are negative.      The following portions of the patient's history were reviewed and updated as  "appropriate: allergies, current medications, past family history, past medical history, past social history, past surgical history and problem list.    Objective   Visit Vitals  /78   Pulse 69   Temp 97.8 °F (36.6 °C) (Temporal)   Resp 16   Ht 177.8 cm (70\")   Wt 97.3 kg (214 lb 6.4 oz)   SpO2 99%   BMI 30.76 kg/m²        Physical Exam  Vitals and nursing note reviewed.   Constitutional:       General: He is not in acute distress.     Appearance: Normal appearance. He is well-developed and well-groomed. He is obese. He is not ill-appearing, toxic-appearing or diaphoretic.      Interventions: Face mask in place.   HENT:      Head: Normocephalic and atraumatic.      Right Ear: Hearing, tympanic membrane, ear canal and external ear normal.      Left Ear: Hearing, tympanic membrane, ear canal and external ear normal.   Eyes:      General: Lids are normal. Gaze aligned appropriately. No scleral icterus.        Right eye: No discharge.         Left eye: No discharge.      Extraocular Movements: Extraocular movements intact.      Conjunctiva/sclera: Conjunctivae normal.      Pupils: Pupils are equal, round, and reactive to light.   Neck:      Thyroid: No thyromegaly.      Trachea: Phonation normal.   Cardiovascular:      Rate and Rhythm: Normal rate and regular rhythm.      Pulses:           Posterior tibial pulses are 2+ on the right side and 2+ on the left side.      Heart sounds: Normal heart sounds.   Pulmonary:      Effort: Pulmonary effort is normal.      Breath sounds: Normal breath sounds and air entry.   Abdominal:      General: Bowel sounds are normal. There is no distension.      Palpations: Abdomen is soft. Abdomen is not rigid. There is no mass.      Tenderness: There is no abdominal tenderness. There is no guarding or rebound.   Musculoskeletal:         General: No deformity or signs of injury.      Cervical back: Neck supple.      Right foot: No deformity, Charcot foot or foot drop.      Left foot: No " deformity, Charcot foot or foot drop.   Feet:      Right foot:      Protective Sensation: 1 site tested. 1 site sensed.     Skin integrity: Skin integrity normal.      Left foot:      Protective Sensation: 1 site tested. 1 site sensed.     Skin integrity: Skin integrity normal.      Comments: Diabetic Foot Exam Performed and Monofilament Test Performed    Skin:     General: Skin is warm.      Capillary Refill: Capillary refill takes less than 2 seconds.      Coloration: Skin is not cyanotic, jaundiced or pale.      Findings: No rash.   Neurological:      General: No focal deficit present.      Mental Status: He is alert and oriented to person, place, and time. Mental status is at baseline.      Cranial Nerves: No dysarthria.      Motor: No tremor, abnormal muscle tone or seizure activity.      Gait: Gait is intact.   Psychiatric:         Attention and Perception: Attention and perception normal.         Mood and Affect: Mood and affect normal.         Speech: Speech normal.         Behavior: Behavior normal. Behavior is cooperative.         Thought Content: Thought content normal.         Cognition and Memory: Cognition and memory normal.         Judgment: Judgment normal.         Lab Results   Component Value Date    CHLPL 105 11/22/2021    TRIG 283 (H) 11/22/2021    HDL 33 (L) 11/22/2021    LDL 30 11/22/2021       Lab Results   Component Value Date    HGBA1C 6.3 12/08/2021    HGBA1C 5.7 04/15/2021    HGBA1C 6.0 08/11/2020       Assessment     Problem List Items Addressed This Visit        Cardiac and Vasculature    Essential hypertension    Relevant Medications    lisinopril (PRINIVIL,ZESTRIL) 10 MG tablet       Endocrine and Metabolic    Diabetes mellitus (HCC)    Current Assessment & Plan     Diabetes is improving with treatment.   Continue current treatment regimen.  Diabetes will be reassessed in 6 months.         Relevant Orders    POC Glycosylated Hemoglobin (Hb A1C) (Completed)      Other Visit Diagnoses      Annual physical exam    -  Primary    Personal history of tobacco use, presenting hazards to health        Relevant Orders     CT Chest Low Dose Cancer Screening WO    Screening for lung cancer        Relevant Orders     CT Chest Low Dose Cancer Screening WO          · Health maintenance information provided with patient plan.   · Counseled on age appropriate health screenings.  · Immunizations for age discussed, encouraged.   · Encourage healthy habits such as exercise, healthy diet.  Patient's Body mass index is 30.76 kg/m². indicating that he is obese (BMI >30). Obesity-related health conditions include the following: hypertension, diabetes mellitus and dyslipidemias. Obesity is unchanged. BMI is is above average; BMI management plan is completed. We discussed portion control, pharmacologic options including metformin and info per avs..  · Discussed burpless fish oil for TGs.  · Return in about 6 months (around 6/8/2022) for Diabetes follow up.     Naya Cerda MD

## 2021-12-08 NOTE — PATIENT INSTRUCTIONS
Health Maintenance, Male  A healthy lifestyle and preventive care is important for your health and wellness. Ask your health care provider about what schedule of regular examinations is right for you.  What should I know about weight and diet?    Eat a Healthy Diet  · Eat plenty of vegetables, fruits, whole grains, low-fat dairy products, and lean protein.  · Do not eat a lot of foods high in solid fats, added sugars, or salt.     Maintain a Healthy Weight  Regular exercise can help you achieve or maintain a healthy weight. You should:  · Do at least 150 minutes of exercise each week. The exercise should increase your heart rate and make you sweat (moderate-intensity exercise).  · Do strength-training exercises at least twice a week.     Watch Your Levels of Cholesterol and Blood Lipids  · Have your blood tested for lipids and cholesterol every 5 years starting at 35 years of age. If you are at high risk for heart disease, you should start having your blood tested when you are 20 years old. You may need to have your cholesterol levels checked more often if:  ? Your lipid or cholesterol levels are high.  ? You are older than 50 years of age.  ? You are at high risk for heart disease.     What should I know about cancer screening?  Many types of cancers can be detected early and may often be prevented.  Lung Cancer  · You should be screened every year for lung cancer if:  ? You are a current smoker who has smoked for at least 30 years.  ? You are a former smoker who has quit within the past 15 years.  · Talk to your health care provider about your screening options, when you should start screening, and how often you should be screened.     Colorectal Cancer  · Routine colorectal cancer screening usually begins at 50 years of age and should be repeated every 5-10 years until you are 75 years old. You may need to be screened more often if early forms of precancerous polyps or small growths are found. Your health care  provider may recommend screening at an earlier age if you have risk factors for colon cancer.  · Your health care provider may recommend using home test kits to check for hidden blood in the stool.  · A small camera at the end of a tube can be used to examine your colon (sigmoidoscopy or colonoscopy). This checks for the earliest forms of colorectal cancer.     Prostate and Testicular Cancer  · Depending on your age and overall health, your health care provider may do certain tests to screen for prostate and testicular cancer.  · Talk to your health care provider about any symptoms or concerns you have about testicular or prostate cancer.     Skin Cancer  · Check your skin from head to toe regularly.  · Tell your health care provider about any new moles or changes in moles, especially if:  ? There is a change in a mole’s size, shape, or color.  ? You have a mole that is larger than a pencil eraser.  · Always use sunscreen. Apply sunscreen liberally and repeat throughout the day.  · Protect yourself by wearing long sleeves, pants, a wide-brimmed hat, and sunglasses when outside.     What should I know about heart disease, diabetes, and high blood pressure?  · If you are 18-39 years of age, have your blood pressure checked every 3-5 years. If you are 40 years of age or older, have your blood pressure checked every year. You should have your blood pressure measured twice--once when you are at a hospital or clinic, and once when you are not at a hospital or clinic. Record the average of the two measurements. To check your blood pressure when you are not at a hospital or clinic, you can use:  ? An automated blood pressure machine at a pharmacy.  ? A home blood pressure monitor.  · Talk to your health care provider about your target blood pressure.  · If you are between 45-79 years old, ask your health care provider if you should take aspirin to prevent heart disease.  · Have regular diabetes screenings by checking your  fasting blood sugar level.  ? If you are at a normal weight and have a low risk for diabetes, have this test once every three years after the age of 45.  ? If you are overweight and have a high risk for diabetes, consider being tested at a younger age or more often.  · A one-time screening for abdominal aortic aneurysm (AAA) by ultrasound is recommended for men aged 65-75 years who are current or former smokers.  What should I know about preventing infection?  Hepatitis B  If you have a higher risk for hepatitis B, you should be screened for this virus. Talk with your health care provider to find out if you are at risk for hepatitis B infection.  Hepatitis C  Blood testing is recommended for:  · Everyone born from 1945 through 1965.  · Anyone with known risk factors for hepatitis C.     Sexually Transmitted Diseases (STDs)  · You should be screened each year for STDs including gonorrhea and chlamydia if:  ? You are sexually active and are younger than 24 years of age.  ? You are older than 24 years of age and your health care provider tells you that you are at risk for this type of infection.  ? Your sexual activity has changed since you were last screened and you are at an increased risk for chlamydia or gonorrhea. Ask your health care provider if you are at risk.  · Talk with your health care provider about whether you are at high risk of being infected with HIV. Your health care provider may recommend a prescription medicine to help prevent HIV infection.     What else can I do?  ·   · Schedule regular health, dental, and eye exams.  · Stay current with your vaccines (immunizations).  · Do not use any tobacco products, such as cigarettes, chewing tobacco, and e-cigarettes. If you need help quitting, ask your health care provider.  · Limit alcohol intake to no more than 2 drinks per day. One drink equals 12 ounces of beer, 5 ounces of wine, or 1½ ounces of hard liquor.  · Do not use street drugs.  · Do not share  needles.  · Ask your health care provider for help if you need support or information about quitting drugs.  · Tell your health care provider if you often feel depressed.  · Tell your health care provider if you have ever been abused or do not feel safe at home.      This information is not intended to replace advice given to you by your health care provider. Make sure you discuss any questions you have with your health care provider.  Document Released: 06/15/2009 Document Revised: 08/16/2017 Document Reviewed: 09/20/2016  Elsevier Interactive Patient Education © 2018 [employer]         Mediterranean Diet  A Mediterranean diet refers to food and lifestyle choices that are based on the traditions of countries located on the [place]. This way of eating has been shown to help prevent certain conditions and improve outcomes for people who have chronic diseases, like kidney disease and heart disease.  What are tips for following this plan?  Lifestyle  · Cook and eat meals together with your family, when possible.  · Drink enough fluid to keep your urine clear or pale yellow.  · Be physically active every day. This includes:  ? Aerobic exercise like running or swimming.  ? Leisure activities like gardening, walking, or housework.  · Get 7-8 hours of sleep each night.  · If recommended by your health care provider, drink red wine in moderation. This means 1 glass a day for nonpregnant women and 2 glasses a day for men. A glass of wine equals 5 oz (150 mL).  Reading food labels    · Check the serving size of packaged foods. For foods such as rice and pasta, the serving size refers to the amount of cooked product, not dry.  · Check the total fat in packaged foods. Avoid foods that have saturated fat or trans fats.  · Check the ingredients list for added sugars, such as corn syrup.    Shopping  · At the grocery store, buy most of your food from the areas near the walls of the store. This includes:  ? Fresh  fruits and vegetables (produce).  ? Grains, beans, nuts, and seeds. Some of these may be available in unpackaged forms or large amounts (in bulk).  ? Fresh seafood.  ? Poultry and eggs.  ? Low-fat dairy products.  · Buy whole ingredients instead of prepackaged foods.  · Buy fresh fruits and vegetables in-season from local farmers markets.  · Buy frozen fruits and vegetables in resealable bags.  · If you do not have access to quality fresh seafood, buy precooked frozen shrimp or canned fish, such as tuna, salmon, or sardines.  · Buy small amounts of raw or cooked vegetables, salads, or olives from the deli or salad bar at your store.  · Stock your pantry so you always have certain foods on hand, such as olive oil, canned tuna, canned tomatoes, rice, pasta, and beans.  Cooking  · Cook foods with extra-virgin olive oil instead of using butter or other vegetable oils.  · Have meat as a side dish, and have vegetables or grains as your main dish. This means having meat in small portions or adding small amounts of meat to foods like pasta or stew.  · Use beans or vegetables instead of meat in common dishes like chili or lasagna.  · Esparto with different cooking methods. Try roasting or broiling vegetables instead of steaming or sautéeing them.  · Add frozen vegetables to soups, stews, pasta, or rice.  · Add nuts or seeds for added healthy fat at each meal. You can add these to yogurt, salads, or vegetable dishes.  · Marinate fish or vegetables using olive oil, lemon juice, garlic, and fresh herbs.  Meal planning    · Plan to eat 1 vegetarian meal one day each week. Try to work up to 2 vegetarian meals, if possible.  · Eat seafood 2 or more times a week.  · Have healthy snacks readily available, such as:  ? Vegetable sticks with hummus.  ? Greek yogurt.  ? Fruit and nut trail mix.  · Eat balanced meals throughout the week. This includes:  ? Fruit: 2-3 servings a day  ? Vegetables: 4-5 servings a day  ? Low-fat dairy: 2  servings a day  ? Fish, poultry, or lean meat: 1 serving a day  ? Beans and legumes: 2 or more servings a week  ? Nuts and seeds: 1-2 servings a day  ? Whole grains: 6-8 servings a day  ? Extra-virgin olive oil: 3-4 servings a day  · Limit red meat and sweets to only a few servings a month    What are my food choices?  · Mediterranean diet  ? Recommended  § Grains: Whole-grain pasta. Brown rice. Bulgar wheat. Polenta. Couscous. Whole-wheat bread. Oatmeal. Quinoa.  § Vegetables: Artichokes. Beets. Broccoli. Cabbage. Carrots. Eggplant. Green beans. Chard. Kale. Spinach. Onions. Leeks. Peas. Squash. Tomatoes. Peppers. Radishes.  § Fruits: Apples. Apricots. Avocado. Berries. Bananas. Cherries. Dates. Figs. Grapes. [place]. Melon. Oranges. Peaches. Plums. Pomegranate.  § Meats and other protein foods: Beans. Almonds. Sunflower seeds. Pine nuts. Peanuts. Cod. [place]. Scallops. Shrimp. Tuna. Tilapia. Clams. Oysters. Eggs.  § Dairy: Low-fat milk. Cheese. Greek yogurt.  § Beverages: Water. Red wine. Herbal tea.  § Fats and oils: Extra virgin olive oil. Avocado oil. Grape seed oil.  § Sweets and desserts: Greek yogurt with honey. Baked apples. Poached pears. Trail mix.  § Seasoning and other foods: Basil. Cilantro. Coriander. Cumin. Mint. Parsley. Juan M. Rosemary. Tarragon. Garlic. Oregano. Thyme. Pepper. Balsalmic vinegar. Tahini. Hummus. Tomato sauce. Olives. Mushrooms.  ? Limit these  § Grains: Prepackaged pasta or rice dishes. Prepackaged cereal with added sugar.  § Vegetables: Deep fried potatoes (french fries).  § Fruits: Fruit canned in syrup.  § Meats and other protein foods: Beef. Pork. Lamb. Poultry with skin. Hot dogs. Rich.  § Dairy: Ice cream. Sour cream. Whole milk.  § Beverages: Juice. Sugar-sweetened soft drinks. Beer. Liquor and spirits.  § Fats and oils: Butter. Canola oil. Vegetable oil. Beef fat (tallow). Lard.  § Sweets and desserts: Cookies. Cakes. Pies. Candy.  § Seasoning and other foods: [facility].  Premade sauces and marinades.  The items listed may not be a complete list. Talk with your dietitian about what dietary choices are right for you.  Summary  · The Mediterranean diet includes both food and lifestyle choices.  · Eat a variety of fresh fruits and vegetables, beans, nuts, seeds, and whole grains.  · Limit the amount of red meat and sweets that you eat.  · Talk with your health care provider about whether it is safe for you to drink red wine in moderation. This means 1 glass a day for nonpregnant women and 2 glasses a day for men. A glass of wine equals 5 oz (150 mL).  This information is not intended to replace advice given to you by your health care provider. Make sure you discuss any questions you have with your health care provider.  Document Revised: 08/17/2017 Document Reviewed: 08/10/2017  [employer] Patient Education © 2020 [employer]

## 2022-01-02 RX ORDER — FLUTICASONE PROPIONATE 50 MCG
SPRAY, SUSPENSION (ML) NASAL
Qty: 48 G | Refills: 3 | Status: SHIPPED | OUTPATIENT
Start: 2022-01-02 | End: 2023-03-30 | Stop reason: SDUPTHER

## 2022-01-03 NOTE — TELEPHONE ENCOUNTER
Rx Refill Note  Requested Prescriptions     Pending Prescriptions Disp Refills   • fluticasone (FLONASE) 50 MCG/ACT nasal spray [Pharmacy Med Name: FLUTICASONE PROP NASAL SPRAY 16GM 50MCG] 48 g 3     Sig: USE 2 SPRAYS IN EACH NOSTRIL DAILY      Last office visit with prescribing clinician: 12/8/2021      Next office visit with prescribing clinician: Visit date not found            Yaneth Pineda LPN  01/02/22, 22:27 EST

## 2022-01-26 RX ORDER — SILDENAFIL 50 MG/1
TABLET, FILM COATED ORAL
Qty: 30 TABLET | Refills: 3 | Status: SHIPPED | OUTPATIENT
Start: 2022-01-26 | End: 2023-03-30 | Stop reason: SDUPTHER

## 2022-01-26 NOTE — TELEPHONE ENCOUNTER
Rx Refill Note  Requested Prescriptions     Pending Prescriptions Disp Refills   • sildenafil (VIAGRA) 50 MG tablet [Pharmacy Med Name: SILDENAFIL TABS 50MG] 30 tablet 3     Sig: TAKE 1 TABLET UP TO ONCE DAILY ONE-HALF (1/2) HOUR TO 4 HOURS BEFORE NEEDED      Last office visit with prescribing clinician: 12/8/2021      Next office visit with prescribing clinician: Visit date not found            Thanh Perea MA  01/26/22, 07:57 EST

## 2022-03-20 DIAGNOSIS — E78.00 PURE HYPERCHOLESTEROLEMIA: ICD-10-CM

## 2022-03-21 RX ORDER — ATORVASTATIN CALCIUM 40 MG/1
TABLET, FILM COATED ORAL
Qty: 90 TABLET | Refills: 3 | Status: SHIPPED | OUTPATIENT
Start: 2022-03-21 | End: 2023-03-30 | Stop reason: SDUPTHER

## 2022-12-13 ENCOUNTER — TELEPHONE (OUTPATIENT)
Dept: INTERNAL MEDICINE | Facility: CLINIC | Age: 62
End: 2022-12-13

## 2022-12-13 NOTE — TELEPHONE ENCOUNTER
Patient did not complete  CT chest lowdose cancer screening WO  ordered on 12/08/2021. This order is too old to be used and has been cancelled.

## 2023-01-17 RX ORDER — SILDENAFIL 50 MG/1
TABLET, FILM COATED ORAL
Qty: 30 TABLET | Refills: 3 | OUTPATIENT
Start: 2023-01-17

## 2023-01-27 ENCOUNTER — OFFICE VISIT (OUTPATIENT)
Dept: INTERNAL MEDICINE | Facility: CLINIC | Age: 63
End: 2023-01-27
Payer: OTHER GOVERNMENT

## 2023-01-27 VITALS
HEIGHT: 70 IN | SYSTOLIC BLOOD PRESSURE: 141 MMHG | WEIGHT: 206 LBS | DIASTOLIC BLOOD PRESSURE: 73 MMHG | HEART RATE: 78 BPM | TEMPERATURE: 98.3 F | RESPIRATION RATE: 15 BRPM | OXYGEN SATURATION: 100 % | BODY MASS INDEX: 29.49 KG/M2

## 2023-01-27 DIAGNOSIS — Z11.3 SCREENING FOR STDS (SEXUALLY TRANSMITTED DISEASES): Primary | ICD-10-CM

## 2023-01-27 PROCEDURE — 99213 OFFICE O/P EST LOW 20 MIN: CPT | Performed by: NURSE PRACTITIONER

## 2023-01-27 NOTE — PROGRESS NOTES
Chief Complaint / Reason:      Chief Complaint   Patient presents with   • Exposure to STD     Is single and wants checked for stds       Subjective     HPI  Answers for HPI/ROS submitted by the patient on 1/27/2023  What is the primary reason for your visit?: Other  Please describe your symptoms.: STD exam  Have you had these symptoms before?: No  How long have you been having these symptoms?: 1-2 weeks  Please list any medications you are currently taking for this condition.: None  Please describe any probable cause for these symptoms. : None      History taken from: patient    PMH/FH/Social History were reviewed and updated appropriately in the electronic medical record.   Past Medical History:   Diagnosis Date   • ADHD (attention deficit hyperactivity disorder)    • CPAP (continuous positive airway pressure) dependence    • Diabetes mellitus (HCC)    • GERD (gastroesophageal reflux disease)    • Hyperlipidemia    • Hypertension      Past Surgical History:   Procedure Laterality Date   • NASAL SEPTUM SURGERY      X2   • NECK SURGERY      FUSION OF C2-3   • SINUS SURGERY     • VASECTOMY       Social History     Socioeconomic History   • Marital status:    Tobacco Use   • Smoking status: Every Day     Types: Electronic Cigarette   • Smokeless tobacco: Never   • Tobacco comments:     former cigarette smoker, quit 2016. Current e-cigarette use.   Substance and Sexual Activity   • Alcohol use: Not Currently     Comment: social   • Drug use: No   • Sexual activity: Yes     Partners: Female     Birth control/protection: Surgical     Comment:  past child baring years     Family History   Problem Relation Age of Onset   • Lung cancer Father    • Diabetes Father    • Cancer Father    • Breast cancer Paternal Aunt    • Breast cancer Paternal Grandmother    • Breast cancer Cousin         PATERNAL   • Diabetes Mother        Review of Systems:   Review of Systems      All other systems were reviewed and are  negative.  Exceptions are noted in the subjective or above.      Objective     Vital Signs  Vitals:    01/27/23 1016   BP: 141/73   Pulse: 78   Resp: 15   Temp: 98.3 °F (36.8 °C)   SpO2: 100%       Body mass index is 29.56 kg/m².    Physical Exam  Vitals and nursing note reviewed.   Constitutional:       Appearance: He is well-developed.   Cardiovascular:      Rate and Rhythm: Normal rate and regular rhythm.      Pulses: Normal pulses.      Heart sounds: Normal heart sounds.   Pulmonary:      Effort: Pulmonary effort is normal.      Breath sounds: Normal breath sounds.   Chest:      Chest wall: No tenderness.   Skin:     General: Skin is warm and dry.      Capillary Refill: Capillary refill takes less than 2 seconds.   Neurological:      Mental Status: He is alert and oriented to person, place, and time.   Psychiatric:         Behavior: Behavior normal.         Thought Content: Thought content normal.         Judgment: Judgment normal.              Results Review:    I reviewed the patient's previous clinical results.       Medication Review:     Current Outpatient Medications:   •  aspirin 81 MG chewable tablet, Chew 1 tablet Daily. (Patient taking differently: Chew 81 mg Every Other Day.), Disp: 90 tablet, Rfl: 1  •  atorvastatin (LIPITOR) 40 MG tablet, TAKE 1 TABLET EVERY NIGHT FOR HIGH CHOLESTEROL WITH DIABETES, Disp: 90 tablet, Rfl: 3  •  fluticasone (FLONASE) 50 MCG/ACT nasal spray, USE 2 SPRAYS IN EACH NOSTRIL DAILY, Disp: 48 g, Rfl: 3  •  levocetirizine (XYZAL) 5 MG tablet, TAKE 1 TABLET EVERY EVENING, Disp: 90 tablet, Rfl: 3  •  lisinopril (PRINIVIL,ZESTRIL) 10 MG tablet, Take 1 tablet by mouth Every Night., Disp: 90 tablet, Rfl: 3  •  metFORMIN (GLUCOPHAGE) 1000 MG tablet, TAKE 1 TABLET TWICE A DAY FOR TYPE 2 DIABETES, Disp: 180 tablet, Rfl: 3  •  sildenafil (VIAGRA) 50 MG tablet, TAKE 1 TABLET UP TO ONCE DAILY ONE-HALF (1/2) HOUR TO 4 HOURS BEFORE NEEDED, Disp: 30 tablet, Rfl: 3    Diagnoses and all  orders for this visit:    Screening for STDs (sexually transmitted diseases)  -     HIV-1 / O / 2 Ag / Antibody 4th Generation  -     RPR  -     Hepatitis Panel, Acute  -     Hepatitis C RNA, Quantitative, PCR (graph)  -     HSV 1 & 2 - Specific Antibody, IgG  -     Chlamydia trachomatis, Neisseria gonorrhoeae, Trichomonas vaginalis, PCR - Urine, Urine, Clean Catch    Discussed STD prevention with patient      Return if symptoms worsen or fail to improve.    Virginia Davis, APRN  01/27/2023

## 2023-01-30 LAB
C TRACH RRNA SPEC QL NAA+PROBE: NEGATIVE
HAV IGM SERPL QL IA: NEGATIVE
HBV CORE IGM SERPL QL IA: NEGATIVE
HBV SURFACE AG SERPL QL IA: NEGATIVE
HCV AB S/CO SERPL IA: <0.1 S/CO RATIO (ref 0–0.9)
HCV AB SERPL QL IA: NORMAL
HCV RNA SERPL NAA+PROBE-ACNC: NORMAL IU/ML
HIV 1+2 AB+HIV1 P24 AG SERPL QL IA: NON REACTIVE
HSV1 IGG SER IA-ACNC: 43.8 INDEX (ref 0–0.9)
HSV2 IGG SER IA-ACNC: 1.4 INDEX (ref 0–0.9)
HSV2 IGG SERPL QL IA: POSITIVE
N GONORRHOEA RRNA SPEC QL NAA+PROBE: NEGATIVE
RPR SER QL: NON REACTIVE
T VAGINALIS RRNA SPEC QL NAA+PROBE: NEGATIVE
TEST INFORMATION: NORMAL

## 2023-01-30 NOTE — PROGRESS NOTES
Patient was notified of results informed him HSV was positive still awaiting urine.  Will send [employer] message once results are back.

## 2023-02-21 DIAGNOSIS — I10 ESSENTIAL HYPERTENSION: ICD-10-CM

## 2023-02-22 NOTE — TELEPHONE ENCOUNTER
Rx Refill Note  Requested Prescriptions     Pending Prescriptions Disp Refills   • lisinopril (PRINIVIL,ZESTRIL) 10 MG tablet [Pharmacy Med Name: LISINOPRIL TABS 10MG] 90 tablet 3     Sig: TAKE 1 TABLET EVERY NIGHT      Last office visit with prescribing clinician: 12/8/2021   Last telemedicine visit with prescribing clinician  Next office visit with prescribing clinician: 3/30/2023                         Would you like a call back once the refill request has been completed: [] Yes [] No    If the office needs to give you a call back, can they leave a voicemail: [] Yes [] No    Ирина Stinson MA  02/22/23, 07:58 EST

## 2023-02-23 RX ORDER — LISINOPRIL 10 MG/1
TABLET ORAL
Qty: 90 TABLET | Refills: 0 | Status: SHIPPED | OUTPATIENT
Start: 2023-02-23 | End: 2023-03-30 | Stop reason: SDUPTHER

## 2023-03-28 NOTE — PATIENT INSTRUCTIONS

## 2023-03-30 ENCOUNTER — OFFICE VISIT (OUTPATIENT)
Dept: INTERNAL MEDICINE | Facility: CLINIC | Age: 63
End: 2023-03-30
Payer: OTHER GOVERNMENT

## 2023-03-30 VITALS
HEIGHT: 70 IN | OXYGEN SATURATION: 95 % | HEART RATE: 68 BPM | WEIGHT: 206 LBS | RESPIRATION RATE: 12 BRPM | DIASTOLIC BLOOD PRESSURE: 84 MMHG | TEMPERATURE: 97 F | BODY MASS INDEX: 29.49 KG/M2 | SYSTOLIC BLOOD PRESSURE: 128 MMHG

## 2023-03-30 DIAGNOSIS — L82.1 SEBORRHEIC KERATOSES: ICD-10-CM

## 2023-03-30 DIAGNOSIS — N52.9 VASCULOGENIC ERECTILE DYSFUNCTION, UNSPECIFIED VASCULOGENIC ERECTILE DYSFUNCTION TYPE: ICD-10-CM

## 2023-03-30 DIAGNOSIS — J30.89 ENVIRONMENTAL AND SEASONAL ALLERGIES: ICD-10-CM

## 2023-03-30 DIAGNOSIS — R79.9 ABNORMAL BLOOD CHEMISTRY: ICD-10-CM

## 2023-03-30 DIAGNOSIS — E11.59 TYPE 2 DIABETES MELLITUS WITH OTHER CIRCULATORY COMPLICATION, WITHOUT LONG-TERM CURRENT USE OF INSULIN: ICD-10-CM

## 2023-03-30 DIAGNOSIS — Z51.81 MEDICATION MONITORING ENCOUNTER: ICD-10-CM

## 2023-03-30 DIAGNOSIS — I10 ESSENTIAL HYPERTENSION: ICD-10-CM

## 2023-03-30 DIAGNOSIS — E66.3 OVERWEIGHT WITH BODY MASS INDEX (BMI) OF 29 TO 29.9 IN ADULT: ICD-10-CM

## 2023-03-30 DIAGNOSIS — Z00.00 ANNUAL PHYSICAL EXAM: Primary | ICD-10-CM

## 2023-03-30 DIAGNOSIS — E78.00 PURE HYPERCHOLESTEROLEMIA: ICD-10-CM

## 2023-03-30 DIAGNOSIS — L57.0 ACTINIC KERATOSES: ICD-10-CM

## 2023-03-30 LAB
POC CREATININE URINE: 200
POC MICROALBUMIN URINE: 10

## 2023-03-30 RX ORDER — LISINOPRIL 10 MG/1
10 TABLET ORAL NIGHTLY
Qty: 90 TABLET | Refills: 1 | Status: SHIPPED | OUTPATIENT
Start: 2023-03-30

## 2023-03-30 RX ORDER — FLUTICASONE PROPIONATE 50 MCG
2 SPRAY, SUSPENSION (ML) NASAL DAILY
Qty: 96 G | Refills: 3 | Status: SHIPPED | OUTPATIENT
Start: 2023-03-30

## 2023-03-30 RX ORDER — ATORVASTATIN CALCIUM 40 MG/1
40 TABLET, FILM COATED ORAL NIGHTLY
Qty: 90 TABLET | Refills: 3 | Status: SHIPPED | OUTPATIENT
Start: 2023-03-30

## 2023-03-30 RX ORDER — SILDENAFIL 50 MG/1
50 TABLET, FILM COATED ORAL DAILY PRN
Qty: 90 TABLET | Refills: 1 | Status: SHIPPED | OUTPATIENT
Start: 2023-03-30

## 2023-03-30 NOTE — PROGRESS NOTES
03/30/2023  Chief Complaint   Patient presents with   • Annual Exam   • Skin Problem     Feels like his scalp is scabby a lot. And has a spot on his back, a friend saw it and told him to have it checked.        Patient Care Team:  Naya Cerda MD as PCP - General (Family Medicine)]       Wale Aguila is here for his annual preventive exam. History per MA reviewed.       Wale Aguila has the following medical issues:  Patient Active Problem List    Diagnosis    • Diabetes mellitus (HCC) [E11.9]    • Essential hypertension [I10]    • Pure hypercholesterolemia [E78.00]        Health Maintenance   Topic Date Due   • Pneumococcal Vaccine 0-64 (2 - PCV) 11/12/2013   • HEMOGLOBIN A1C  06/08/2022   • LIPID PANEL  11/22/2022   • ANNUAL PHYSICAL  12/08/2022   • DIABETIC EYE EXAM  12/08/2022   • DIABETIC FOOT EXAM  04/14/2023 (Originally 12/8/2022)   • URINE MICROALBUMIN  03/30/2024   • COLORECTAL CANCER SCREENING  01/01/2025   • TDAP/TD VACCINES (4 - Td or Tdap) 10/01/2027   • HEPATITIS C SCREENING  Completed   • COVID-19 Vaccine  Completed   • INFLUENZA VACCINE  Completed   • ZOSTER VACCINE  Completed       Immunization History   Administered Date(s) Administered   • COVID-19 (MODERNA) 1st, 2nd, 3rd Dose Only 02/22/2021, 03/22/2021, 11/20/2021   • COVID-19 (PFIZER) BIVALENT BOOSTER 12+YRS 01/06/2023   • Flu Vaccine Quad PF >36MO 09/11/2021   • FluLaval/Fluzone >6mos 10/07/2020, 09/11/2021   • FluMist 2-49yrs 10/01/2017   • Flucelvax Quad Vial =>4yrs 09/28/2018   • Influenza TIV (IM) 10/01/2017   • Influenza, Unspecified 10/26/2010, 09/06/2011, 09/11/2021, 10/03/2022   • Pneumococcal Polysaccharide (PPSV23) 11/12/2012   • Shingrix 10/07/2020, 01/17/2021   • Td 10/01/2015   • Tdap 11/12/2012, 10/01/2017   • Zostavax 03/20/2017   • flucelvax quad pfs =>4 YRS 10/01/2019         The following portions of the patient's history were reviewed and updated as appropriate: allergies, current medications, past family history,  "past medical history, past social history, past surgical history and problem list.    Objective   Visit Vitals  /84 (BP Location: Right arm, Patient Position: Sitting, Cuff Size: Adult)   Pulse 68   Temp 97 °F (36.1 °C)   Resp 12   Ht 177.8 cm (70\")   Wt 93.4 kg (206 lb)   SpO2 95%   BMI 29.56 kg/m²        Physical Exam  Vitals and nursing note reviewed.   Constitutional:       General: He is not in acute distress.     Appearance: Normal appearance. He is well-developed, well-groomed and overweight. obeseHe is not ill-appearing, toxic-appearing or diaphoretic.   HENT:      Head: Normocephalic and atraumatic.      Right Ear: Hearing, tympanic membrane, ear canal and external ear normal.      Left Ear: Hearing, tympanic membrane, ear canal and external ear normal.      Nose: Nose normal.      Mouth/Throat:      Mouth: Mucous membranes are moist.   Eyes:      General: Lids are normal. Gaze aligned appropriately. No scleral icterus.        Right eye: No discharge.         Left eye: No discharge.      Extraocular Movements: Extraocular movements intact.      Conjunctiva/sclera: Conjunctivae normal.      Pupils: Pupils are equal, round, and reactive to light.   Neck:      Thyroid: No thyromegaly.      Trachea: Phonation normal.   Cardiovascular:      Rate and Rhythm: Normal rate and regular rhythm.      Heart sounds: Normal heart sounds.   Pulmonary:      Effort: Pulmonary effort is normal.      Breath sounds: Normal breath sounds and air entry.   Abdominal:      General: Bowel sounds are normal. There is no distension.      Palpations: Abdomen is soft.      Tenderness: There is no abdominal tenderness. There is no guarding or rebound.   Musculoskeletal:         General: No deformity or signs of injury.      Cervical back: Neck supple.   Skin:     General: Skin is warm.      Capillary Refill: Capillary refill takes less than 2 seconds.      Coloration: Skin is not cyanotic, jaundiced or pale.      Findings: No rash. "          Neurological:      General: No focal deficit present.      Mental Status: He is alert and oriented to person, place, and time. Mental status is at baseline.      Cranial Nerves: No dysarthria.      Motor: No tremor, abnormal muscle tone or seizure activity.      Gait: Gait is intact.   Psychiatric:         Attention and Perception: Attention and perception normal.         Mood and Affect: Mood and affect normal.         Speech: Speech normal.         Behavior: Behavior normal. Behavior is cooperative.         Thought Content: Thought content normal.         Cognition and Memory: Cognition and memory normal.         Judgment: Judgment normal.     CLINICAL PHOTOGRAPHS OTHER - Back lesion (03/30/2023)    Lab Results   Component Value Date    CHLPL 105 11/22/2021    TRIG 283 (H) 11/22/2021    HDL 33 (L) 11/22/2021    LDL 30 11/22/2021     No results found for: TSH  No results found for: FREET4  Lab Results   Component Value Date    HGBA1C 6.3 12/08/2021    HGBA1C 5.7 04/15/2021    HGBA1C 6.0 08/11/2020     Office Visit on 03/30/2023   Component Date Value Ref Range Status   • Microalbumin, Urine 03/30/2023 10   Final   • Creatinine, Urine 03/30/2023 200   Final          Assessment   Diagnoses and all orders for this visit:    1. Annual physical exam (Primary)    2. Type 2 diabetes mellitus with other circulatory complication, without long-term current use of insulin (HCC)  -     POC Microalbumin  -     metFORMIN (GLUCOPHAGE) 1000 MG tablet; Take 1 tablet by mouth 2 (Two) Times a Day With Meals.  Dispense: 180 tablet; Refill: 3  -     TSH Rfx On Abnormal To Free T4    3. Vasculogenic erectile dysfunction, unspecified vasculogenic erectile dysfunction type  -     sildenafil (VIAGRA) 50 MG tablet; Take 1 tablet by mouth Daily As Needed for Erectile Dysfunction.  Dispense: 90 tablet; Refill: 1    4. Essential hypertension  -     lisinopril (PRINIVIL,ZESTRIL) 10 MG tablet; Take 1 tablet by mouth Every Night.   Dispense: 90 tablet; Refill: 1  -     TSH Rfx On Abnormal To Free T4    5. Pure hypercholesterolemia  -     atorvastatin (LIPITOR) 40 MG tablet; Take 1 tablet by mouth Every Night.  Dispense: 90 tablet; Refill: 3    6. Seborrheic keratoses  Comments:  reassurance given  Orders:  -     Ambulatory Referral to Dermatology    7. Actinic keratoses  -     Ambulatory Referral to Dermatology    8. Environmental and seasonal allergies  Comments:  getting xyzal otc cheaper  Orders:  -     fluticasone (FLONASE) 50 MCG/ACT nasal spray; 2 sprays by Each Nare route Daily.  Dispense: 96 g; Refill: 3    9. Overweight with body mass index (BMI) of 29 to 29.9 in adult  Comments:  info via avs    10. Medication monitoring encounter  -     Hemoglobin A1c  -     Lipid Panel  -     Vitamin B12 Deficiency Cascade  -     CBC (No Diff)  -     Comprehensive Metabolic Panel  -     TSH Rfx On Abnormal To Free T4    11. Abnormal blood chemistry  -     Hemoglobin A1c  -     Lipid Panel  -     Vitamin B12 Deficiency Cascade  -     CBC (No Diff)  -     Comprehensive Metabolic Panel  -     TSH Rfx On Abnormal To Free T4         · Health maintenance information provided via patient plan (after visit summary).   · Counseled on age appropriate health screenings. Risks benefits of PSA discussed shared decision making not checking   · Immunizations for age discussed, encouraged. He reports having pneumonia shot at [facility] in last year we'll try to get record.  · Encouraged healthy habits such as exercise, healthy diet.  BMI is >= 25 and <30. (Overweight) The following options were offered after discussion;: weight loss educational material (shared in after visit summary)  ·   ·   · Return in about 6 months (around 9/30/2023) for Diabetes follow up.     Naya Cerda MD

## 2023-04-04 LAB
ALBUMIN SERPL-MCNC: 4.5 G/DL (ref 3.5–5.2)
ALBUMIN/GLOB SERPL: 2.4 G/DL
ALP SERPL-CCNC: 95 U/L (ref 39–117)
ALT SERPL-CCNC: 25 U/L (ref 1–41)
AST SERPL-CCNC: 15 U/L (ref 1–40)
BILIRUB SERPL-MCNC: 0.3 MG/DL (ref 0–1.2)
BUN SERPL-MCNC: 14 MG/DL (ref 8–23)
BUN/CREAT SERPL: 12.6 (ref 7–25)
CALCIUM SERPL-MCNC: 10.3 MG/DL (ref 8.6–10.5)
CHLORIDE SERPL-SCNC: 106 MMOL/L (ref 98–107)
CHOLEST SERPL-MCNC: 116 MG/DL (ref 0–200)
CO2 SERPL-SCNC: 27.1 MMOL/L (ref 22–29)
CREAT SERPL-MCNC: 1.11 MG/DL (ref 0.76–1.27)
EGFRCR SERPLBLD CKD-EPI 2021: 76 ML/MIN/1.73
ERYTHROCYTE [DISTWIDTH] IN BLOOD BY AUTOMATED COUNT: 11.9 % (ref 12.3–15.4)
GLOBULIN SER CALC-MCNC: 1.9 GM/DL
GLUCOSE SERPL-MCNC: 147 MG/DL (ref 65–99)
HBA1C MFR BLD: 6.1 % (ref 4.8–5.6)
HCT VFR BLD AUTO: 41.5 % (ref 37.5–51)
HDLC SERPL-MCNC: 27 MG/DL (ref 40–60)
HGB BLD-MCNC: 14 G/DL (ref 13–17.7)
IMP & REVIEW OF LAB RESULTS: NORMAL
LDLC SERPL CALC-MCNC: 32 MG/DL (ref 0–100)
MCH RBC QN AUTO: 32.3 PG (ref 26.6–33)
MCHC RBC AUTO-ENTMCNC: 33.7 G/DL (ref 31.5–35.7)
MCV RBC AUTO: 95.6 FL (ref 79–97)
METHYLMALONATE SERPL-SCNC: 241 NMOL/L (ref 0–378)
PLATELET # BLD AUTO: 246 10*3/MM3 (ref 140–450)
POTASSIUM SERPL-SCNC: 4.6 MMOL/L (ref 3.5–5.2)
PROT SERPL-MCNC: 6.4 G/DL (ref 6–8.5)
RBC # BLD AUTO: 4.34 10*6/MM3 (ref 4.14–5.8)
SODIUM SERPL-SCNC: 142 MMOL/L (ref 136–145)
TRIGL SERPL-MCNC: 398 MG/DL (ref 0–150)
TSH SERPL DL<=0.005 MIU/L-ACNC: 2.41 UIU/ML (ref 0.27–4.2)
VIT B12 SERPL-MCNC: 332 PG/ML (ref 232–1245)
VLDLC SERPL CALC-MCNC: 57 MG/DL (ref 5–40)
WBC # BLD AUTO: 7.34 10*3/MM3 (ref 3.4–10.8)

## 2023-12-05 DIAGNOSIS — I10 ESSENTIAL HYPERTENSION: ICD-10-CM

## 2023-12-06 RX ORDER — LISINOPRIL 10 MG/1
10 TABLET ORAL NIGHTLY
Qty: 90 TABLET | Refills: 3 | Status: SHIPPED | OUTPATIENT
Start: 2023-12-06

## 2023-12-06 NOTE — TELEPHONE ENCOUNTER
Rx Refill Note  Requested Prescriptions     Pending Prescriptions Disp Refills    lisinopril (PRINIVIL,ZESTRIL) 10 MG tablet [Pharmacy Med Name: LISINOPRIL TABS 10MG] 90 tablet 3     Sig: TAKE 1 TABLET EVERY NIGHT      Last office visit with prescribing clinician: 3/30/2023   Next office visit with prescribing clinician: 12/20/2023       Thanh Perea MA  12/06/23, 07:35 EST

## 2023-12-20 ENCOUNTER — OFFICE VISIT (OUTPATIENT)
Dept: INTERNAL MEDICINE | Facility: CLINIC | Age: 63
End: 2023-12-20
Payer: OTHER GOVERNMENT

## 2023-12-20 VITALS
TEMPERATURE: 97.3 F | DIASTOLIC BLOOD PRESSURE: 78 MMHG | HEART RATE: 62 BPM | SYSTOLIC BLOOD PRESSURE: 124 MMHG | WEIGHT: 222 LBS | BODY MASS INDEX: 31.78 KG/M2 | HEIGHT: 70 IN | RESPIRATION RATE: 16 BRPM | OXYGEN SATURATION: 98 %

## 2023-12-20 DIAGNOSIS — I10 ESSENTIAL HYPERTENSION: ICD-10-CM

## 2023-12-20 DIAGNOSIS — E66.09 CLASS 1 OBESITY DUE TO EXCESS CALORIES WITH SERIOUS COMORBIDITY AND BODY MASS INDEX (BMI) OF 31.0 TO 31.9 IN ADULT: ICD-10-CM

## 2023-12-20 DIAGNOSIS — E11.59 TYPE 2 DIABETES MELLITUS WITH OTHER CIRCULATORY COMPLICATION, WITHOUT LONG-TERM CURRENT USE OF INSULIN: Primary | ICD-10-CM

## 2023-12-20 LAB
EXPIRATION DATE: ABNORMAL
HBA1C MFR BLD: 6.6 % (ref 4.5–5.7)
Lab: ABNORMAL

## 2023-12-20 NOTE — PATIENT INSTRUCTIONS

## 2023-12-20 NOTE — ASSESSMENT & PLAN NOTE
Diabetes is worsening.   Medication changes per orders.  Diabetes will be reassessed  at next visit .

## 2023-12-20 NOTE — PROGRESS NOTES
"Chief Complaint  Diabetes (3 month follow up)    Subjective        Wale Aguila presents to [facility]  Diabetes  He presents for his follow-up diabetic visit. He has type 2 diabetes mellitus. Current diabetic treatment includes oral agent (monotherapy) (some diarrhea at times with metformin). An ACE inhibitor/angiotensin II receptor blocker is being taken.   Hypertension  This is a chronic problem. The current episode started more than 1 year ago. The problem has been waxing and waning since onset. The problem is controlled. Current antihypertension treatment includes ACE inhibitors. The current treatment provides significant improvement. Compliance problems include exercise and diet.        Objective   Vital Signs:  /78 (BP Location: Right arm, Patient Position: Sitting, Cuff Size: Adult)   Pulse 62   Temp 97.3 °F (36.3 °C) (Temporal)   Resp 16   Ht 177.8 cm (70\")   Wt 101 kg (222 lb)   SpO2 98%   BMI 31.85 kg/m²   Estimated body mass index is 31.85 kg/m² as calculated from the following:    Height as of this encounter: 177.8 cm (70\").    Weight as of this encounter: 101 kg (222 lb).        BMI is >= 30 and <35. (Class 1 Obesity). The following options were offered after discussion;: pharmacological intervention options         Physical Exam  Vitals and nursing note reviewed.   Constitutional:       General: He is not in acute distress.     Appearance: Normal appearance. He is well-developed and well-groomed. He is obese. He is not ill-appearing, toxic-appearing or diaphoretic.   HENT:      Head: Normocephalic and atraumatic.      Right Ear: Hearing, tympanic membrane, ear canal and external ear normal.      Left Ear: Hearing, tympanic membrane, ear canal and external ear normal.      Ears:      Comments: Hearing aids bilaterally  Eyes:      General: Lids are normal. No scleral icterus.        Right eye: No discharge.         Left eye: No discharge.      Extraocular " Movements: Extraocular movements intact.   Cardiovascular:      Rate and Rhythm: Normal rate and regular rhythm.   Pulmonary:      Effort: Pulmonary effort is normal.   Musculoskeletal:      Cervical back: Neck supple.   Skin:     Coloration: Skin is not jaundiced or pale.   Neurological:      General: No focal deficit present.      Mental Status: He is alert and oriented to person, place, and time.   Psychiatric:         Attention and Perception: Attention and perception normal.         Mood and Affect: Mood and affect normal.         Speech: Speech normal.         Behavior: Behavior normal. Behavior is cooperative.         Thought Content: Thought content normal.         Cognition and Memory: Cognition and memory normal.         Judgment: Judgment normal.        Result Review :  The following data was reviewed by: Naya Cerda MD on 12/20/2023:  Lab Results   Component Value Date    HGBA1C 6.6 (A) 12/20/2023    HGBA1C 6.10 (H) 03/31/2023    HGBA1C 6.3 12/08/2021     Next labs defer thyroid, folate, vitamin D as last levels normal.           Assessment and Plan   Diagnoses and all orders for this visit:    1. Type 2 diabetes mellitus with other circulatory complication, without long-term current use of insulin (Primary)  Assessment & Plan:  Diabetes is worsening.   Medication changes per orders.  Diabetes will be reassessed  at next visit .    Orders:  -     POC Glycosylated Hemoglobin (Hb A1C)  -     Tirzepatide (Mounjaro) 2.5 MG/0.5ML solution pen-injector pen; Inject 0.5 mL under the skin into the appropriate area as directed 1 (One) Time Per Week for 4 doses.  Dispense: 2 mL; Refill: 0  -     Tirzepatide (Mounjaro) 5 MG/0.5ML solution pen-injector pen; Inject 0.5 mL under the skin into the appropriate area as directed 1 (One) Time Per Week.  Dispense: 2 mL; Refill: 1    2. Essential hypertension  Assessment & Plan:  Hypertension is improving with treatment.  Continue current treatment regimen.  Weight  loss.  Blood pressure will be reassessed at the next regular appointment.      3. Class 1 obesity due to excess calories with serious comorbidity and body mass index (BMI) of 31.0 to 31.9 in adult      No personal history of gastroparesis nor pancreatitis. Patient has gallbladder but no known problems. Some patients develop issues when using this medicine class. No family history of thyroid cancer, pancreatic cancer nor multiple endocrine neoplasia. Discussed possible side effects of  Mounjaro including but not limited to nausea, acid reflux, constipation. Stay hydrated. Insurance may or may not cover. Discussed dose titrations. Encouraged MyChart communication for any questions/concerns.  Can send Ozempic if Mounjaro not covered.          Follow Up   Return in about 4 months (around 4/20/2024) for Annual physical, Diabetes follow up, labs at least 1-2 days prior to visit.  Patient was given instructions and counseling regarding his condition or for health maintenance advice. Please see specific information pulled into the AVS if appropriate.

## 2024-01-17 ENCOUNTER — PATIENT MESSAGE (OUTPATIENT)
Dept: INTERNAL MEDICINE | Facility: CLINIC | Age: 64
End: 2024-01-17
Payer: OTHER GOVERNMENT

## 2024-01-17 NOTE — TELEPHONE ENCOUNTER
From: Wale Aguila  To: Naya Cerda  Sent: 1/17/2024 11:27 AM EST  Subject: Status of Conchis Pen    Dr Cerda, is there anything I need to do to get approval for Mounjaro? I noticed on my [employer] it says Stopped.    Thank you very much,

## 2024-01-18 ENCOUNTER — PRIOR AUTHORIZATION (OUTPATIENT)
Dept: INTERNAL MEDICINE | Facility: CLINIC | Age: 64
End: 2024-01-18
Payer: OTHER GOVERNMENT

## 2024-01-18 NOTE — TELEPHONE ENCOUNTER
Trulicity is available to [facility] beneficiaries at a lower copay than Ozempic or Mounjaro. Trulicity also has an indication to reduce the risk of major adverse cardiovascular events in adults with Type 2 diabetes mellitus (T2DM) who have established cardiovascular disease or multiple cardiovascular risk factors; Mounjaro does not have this indication.    FAITH SINGH ([place]: [id number])  PA Case ID #: [id number]  Need Help? Call us at [phone]  Outcome  Approved today  CaseId:[id number];Status:Approved;Review Type:Prior Auth;Coverage Start Date:12/19/2023;Coverage End Date:12/31/2099;  Authorization Expiration Date: 12/30/2099  Drug  Mounjaro 2.5MG/0.5ML pen-injectors  ePA cloud logo  Form   Electronic PA Form (2017 [employer])    LVM for the patient.

## 2024-02-06 DIAGNOSIS — E11.59 TYPE 2 DIABETES MELLITUS WITH OTHER CIRCULATORY COMPLICATION, WITHOUT LONG-TERM CURRENT USE OF INSULIN: ICD-10-CM

## 2024-02-07 RX ORDER — TIRZEPATIDE 2.5 MG/.5ML
INJECTION, SOLUTION SUBCUTANEOUS
Qty: 2 ML | Refills: 12 | Status: SHIPPED | OUTPATIENT
Start: 2024-02-07 | End: 2024-02-07 | Stop reason: SDUPTHER

## 2024-02-07 NOTE — TELEPHONE ENCOUNTER
Rx Refill Note  Requested Prescriptions     Pending Prescriptions Disp Refills    Mounjaro 2.5 MG/0.5ML solution pen-injector pen [Pharmacy Med Name: MOUNJARO PEN 0.5ML 4'S 2.5MG] 2 mL 12     Sig: INJECT 0.5 ML UNDER THE SKIN IN THE APPROPRIATE AREA AS DIRECTED ONE TIME PER WEEK FOR 4 DOSES      Last office visit with prescribing clinician: 12/20/2023   Last telemedicine visit with prescribing clinician: Visit date not found   Next office visit with prescribing clinician: 4/23/2024       Wicho Urbina MA  02/07/24, 12:48 EST

## 2024-04-10 DIAGNOSIS — E11.59 TYPE 2 DIABETES MELLITUS WITH OTHER CIRCULATORY COMPLICATION, WITHOUT LONG-TERM CURRENT USE OF INSULIN: ICD-10-CM

## 2024-04-11 NOTE — TELEPHONE ENCOUNTER
Rx Refill Note  Requested Prescriptions     Pending Prescriptions Disp Refills    Tirzepatide (Mounjaro) 5 MG/0.5ML solution pen-injector pen 2 mL 1     Sig: Inject 0.5 mL under the skin into the appropriate area as directed 1 (One) Time Per Week.      Last office visit with prescribing clinician: 12/20/2023   Next office visit with prescribing clinician: 4/23/2024       Thanh Perea MA  04/11/24, 10:22 EDT

## 2024-04-23 ENCOUNTER — OFFICE VISIT (OUTPATIENT)
Dept: INTERNAL MEDICINE | Facility: CLINIC | Age: 64
End: 2024-04-23
Payer: OTHER GOVERNMENT

## 2024-04-23 VITALS
OXYGEN SATURATION: 99 % | DIASTOLIC BLOOD PRESSURE: 76 MMHG | BODY MASS INDEX: 30.84 KG/M2 | WEIGHT: 215.4 LBS | TEMPERATURE: 97.1 F | SYSTOLIC BLOOD PRESSURE: 124 MMHG | RESPIRATION RATE: 16 BRPM | HEIGHT: 70 IN | HEART RATE: 55 BPM

## 2024-04-23 DIAGNOSIS — R74.8 ELEVATED ALKALINE PHOSPHATASE LEVEL: ICD-10-CM

## 2024-04-23 DIAGNOSIS — Z51.81 MEDICATION MONITORING ENCOUNTER: ICD-10-CM

## 2024-04-23 DIAGNOSIS — Z00.00 ANNUAL PHYSICAL EXAM: Primary | ICD-10-CM

## 2024-04-23 DIAGNOSIS — J34.89 RHINORRHEA: ICD-10-CM

## 2024-04-23 DIAGNOSIS — E53.8 LOW SERUM VITAMIN B12: ICD-10-CM

## 2024-04-23 DIAGNOSIS — N28.9 ABNORMAL RENAL FUNCTION FINDING: ICD-10-CM

## 2024-04-23 DIAGNOSIS — I10 ESSENTIAL HYPERTENSION: ICD-10-CM

## 2024-04-23 DIAGNOSIS — R79.9 ABNORMAL BLOOD CHEMISTRY: ICD-10-CM

## 2024-04-23 DIAGNOSIS — E78.00 PURE HYPERCHOLESTEROLEMIA: ICD-10-CM

## 2024-04-23 DIAGNOSIS — E11.59 TYPE 2 DIABETES MELLITUS WITH OTHER CIRCULATORY COMPLICATION, WITHOUT LONG-TERM CURRENT USE OF INSULIN: ICD-10-CM

## 2024-04-23 DIAGNOSIS — Z12.5 PROSTATE CANCER SCREENING: ICD-10-CM

## 2024-04-23 LAB
ALBUMIN SERPL-MCNC: 4.5 G/DL (ref 3.5–5.2)
ALBUMIN/GLOB SERPL: 2.3 G/DL
ALP SERPL-CCNC: 114 U/L (ref 39–117)
ALT SERPL-CCNC: 30 U/L (ref 1–41)
AST SERPL-CCNC: 18 U/L (ref 1–40)
BILIRUB SERPL-MCNC: 0.3 MG/DL (ref 0–1.2)
BUN SERPL-MCNC: 11 MG/DL (ref 8–23)
BUN/CREAT SERPL: 8.4 (ref 7–25)
CALCIUM SERPL-MCNC: 9.6 MG/DL (ref 8.6–10.5)
CHLORIDE SERPL-SCNC: 104 MMOL/L (ref 98–107)
CO2 SERPL-SCNC: 26 MMOL/L (ref 22–29)
CREAT SERPL-MCNC: 1.31 MG/DL (ref 0.76–1.27)
EGFRCR SERPLBLD CKD-EPI 2021: 61.9 ML/MIN/1.73
GLOBULIN SER CALC-MCNC: 2 GM/DL
GLUCOSE SERPL-MCNC: 184 MG/DL (ref 65–99)
POTASSIUM SERPL-SCNC: 4.4 MMOL/L (ref 3.5–5.2)
PROT SERPL-MCNC: 6.5 G/DL (ref 6–8.5)
SODIUM SERPL-SCNC: 139 MMOL/L (ref 136–145)

## 2024-04-23 PROCEDURE — 99396 PREV VISIT EST AGE 40-64: CPT | Performed by: FAMILY MEDICINE

## 2024-04-23 RX ORDER — ATORVASTATIN CALCIUM 40 MG/1
40 TABLET, FILM COATED ORAL NIGHTLY
Qty: 90 TABLET | Refills: 3 | Status: SHIPPED | OUTPATIENT
Start: 2024-04-23

## 2024-04-23 NOTE — PROGRESS NOTES
"04/23/2024  Chief Complaint   Patient presents with    Annual Exam       Patient Care Team:  Naya Cerda MD as PCP - General (Family Medicine)]       Wale Aguila is here for his annual preventive exam. History per MA reviewed.     Didn't like Mounjaro 5 mg, had \"sulfur burps\" and felt constantly bloated. Didn't feel like he was processing food. Stomach never felt empty. Also had trouble finding it at 5 mg, went back to 2.5 mg and he feels more \"normal\". Did a full month of 5 mg and side effects did not improve by fourth week.     \"Left sinus\" draining x 2 days. Doesn't feel bad. Having to dab nose, dripping.     Health Maintenance   Topic Date Due    Pneumococcal Vaccine 0-64 (2 of 2 - PCV) 11/12/2013    DIABETIC FOOT EXAM  12/08/2022    ANNUAL PHYSICAL  03/30/2024    URINE MICROALBUMIN  05/10/2024 (Originally 3/30/2024)    DIABETIC EYE EXAM  06/11/2024 (Originally 12/8/2022)    INFLUENZA VACCINE  08/01/2024    HEMOGLOBIN A1C  10/22/2024    BMI FOLLOWUP  12/20/2024    COLORECTAL CANCER SCREENING  01/01/2025    LIPID PANEL  04/22/2025    TDAP/TD VACCINES (4 - Td or Tdap) 10/01/2027    HEPATITIS C SCREENING  Completed    COVID-19 Vaccine  Completed    RSV Vaccine - Adults  Completed    ZOSTER VACCINE  Completed       Immunization History   Administered Date(s) Administered    Arexvy (RSV, Adults 60+ yrs) 10/07/2023    COVID-19 (MODERNA) 1st,2nd,3rd Dose Monovalent 02/22/2021, 03/22/2021, 11/20/2021    COVID-19 (PFIZER) BIVALENT 12+YRS 01/06/2023    COVID-19 F23 (MODERNA) 12YRS+ (SPIKEVAX) 10/07/2023    Flu Vaccine Quad PF >36MO 09/11/2021    FluMist 2-49yrs 10/01/2017    Flucelvax Quad Vial =>4yrs 09/28/2018    Fluzone (or Fluarix & Flulaval for VFC) >6mos 10/07/2020, 09/11/2021    Influenza Injectable Mdck Pf Quad 10/07/2023    Influenza TIV (IM) 10/01/2017    Influenza, Unspecified 10/26/2010, 09/06/2011, 09/11/2021, 10/03/2022    Pneumococcal Polysaccharide (PPSV23) 11/12/2012    Shingrix 10/07/2020, " "01/17/2021    Td (TDVAX) 10/01/2015    Tdap 11/12/2012, 10/01/2017    Zostavax 03/20/2017    flucelvax quad pfs =>4 YRS 10/01/2019         The following portions of the patient's history were reviewed and updated as appropriate: allergies, current medications, past family history, past medical history, past social history, past surgical history and problem list.    Objective   Visit Vitals  /76 (BP Location: Right arm, Patient Position: Sitting, Cuff Size: Adult)   Pulse 55   Temp 97.1 °F (36.2 °C) (Temporal)   Resp 16   Ht 177.8 cm (70\")   Wt 97.7 kg (215 lb 6.4 oz)   SpO2 99%   BMI 30.91 kg/m²              Physical Exam  Vitals and nursing note reviewed.   Constitutional:       General: He is not in acute distress.     Appearance: Normal appearance. He is well-developed and well-groomed. He is obese. He is not ill-appearing, toxic-appearing or diaphoretic.   HENT:      Head: Normocephalic and atraumatic.      Right Ear: Hearing, tympanic membrane, ear canal and external ear normal.      Left Ear: Hearing, tympanic membrane, ear canal and external ear normal.      Nose: Nose normal.      Mouth/Throat:      Mouth: Mucous membranes are moist.   Eyes:      General: Lids are normal. Gaze aligned appropriately. No scleral icterus.        Right eye: No discharge.         Left eye: No discharge.      Extraocular Movements: Extraocular movements intact.      Conjunctiva/sclera: Conjunctivae normal.      Pupils: Pupils are equal, round, and reactive to light.   Neck:      Thyroid: No thyromegaly.      Trachea: Phonation normal.   Cardiovascular:      Rate and Rhythm: Normal rate and regular rhythm.      Heart sounds: Normal heart sounds.   Pulmonary:      Effort: Pulmonary effort is normal.      Breath sounds: Normal breath sounds and air entry.   Abdominal:      General: Bowel sounds are normal. There is no distension.      Palpations: Abdomen is soft.      Tenderness: There is no abdominal tenderness. There is no " "guarding or rebound.   Musculoskeletal:         General: No deformity or signs of injury.      Cervical back: Neck supple.   Skin:     General: Skin is warm.      Capillary Refill: Capillary refill takes less than 2 seconds.      Coloration: Skin is not cyanotic, jaundiced or pale.      Findings: No rash.   Neurological:      General: No focal deficit present.      Mental Status: He is alert and oriented to person, place, and time. Mental status is at baseline.      Cranial Nerves: No dysarthria.      Motor: No tremor, abnormal muscle tone or seizure activity.      Gait: Gait is intact.   Psychiatric:         Attention and Perception: Attention and perception normal.         Mood and Affect: Mood and affect normal.         Speech: Speech normal.         Behavior: Behavior normal. Behavior is cooperative.         Thought Content: Thought content normal.         Cognition and Memory: Cognition and memory normal.         Judgment: Judgment normal.         Lab Results   Component Value Date    CHLPL 95 04/22/2024    TRIG 365 (H) 04/22/2024    HDL 30 (L) 04/22/2024    LDL 14 04/22/2024     Lab Results   Component Value Date    TSH 2.410 03/31/2023     No results found for: \"FREET4\"  Lab Results   Component Value Date    HGBA1C 6.10 (H) 04/22/2024    HGBA1C 6.6 (A) 12/20/2023    HGBA1C 6.10 (H) 03/31/2023     Letter from Naya Cerda MD (04/23/2024)     Assessment   Diagnoses and all orders for this visit:    1. Annual physical exam (Primary)  -     Vitamin B12; Future  -     Comprehensive Metabolic Panel; Future  -     CBC (No Diff); Future  -     PSA Screen; Future  -     Lipid Panel; Future  -     Hemoglobin A1c; Future    2. Type 2 diabetes mellitus with other circulatory complication, without long-term current use of insulin  -     Comprehensive Metabolic Panel; Future  -     CBC (No Diff); Future  -     Hemoglobin A1c; Future  -     metFORMIN (GLUCOPHAGE) 1000 MG tablet; Take 1 tablet by mouth 2 (Two) Times " a Day With Meals. For diabetes  Dispense: 180 tablet; Refill: 3    3. Essential hypertension  -     Comprehensive Metabolic Panel; Future  -     CBC (No Diff); Future    4. Pure hypercholesterolemia  -     Lipid Panel; Future  -     atorvastatin (LIPITOR) 40 MG tablet; Take 1 tablet by mouth Every Night. To lower cholesterol and risk of stroke.  Dispense: 90 tablet; Refill: 3    5. Low serum vitamin B12  -     Vitamin B12; Future  -     CBC (No Diff); Future    6. Abnormal renal function finding  -     Comprehensive Metabolic Panel    7. Elevated alkaline phosphatase level  Comments:  type O blood, was fasting for labs, possibly mild elevation due to that  Orders:  -     Comprehensive Metabolic Panel    8. Rhinorrhea  Comments:  left sided x 2 days, if continues may need tested for CSF leak    9. Medication monitoring encounter  -     Vitamin B12; Future  -     Comprehensive Metabolic Panel; Future  -     CBC (No Diff); Future  -     Lipid Panel; Future  -     Hemoglobin A1c; Future    10. Abnormal blood chemistry  -     Vitamin B12; Future  -     Comprehensive Metabolic Panel; Future  -     CBC (No Diff); Future  -     Lipid Panel; Future  -     Hemoglobin A1c; Future  -     Comprehensive Metabolic Panel    11. Prostate cancer screening  -     PSA Screen; Future         Health maintenance information provided via patient plan (after visit summary).   Counseled on age appropriate health screenings and immunizations. Defer Prevnar 20 until fall when he returns for flu shot, do together, increased immune response.   Encouraged exercise and healthy diet.    BMI is >= 30 and <35. (Class 1 Obesity). The following options were offered after discussion;: weight loss educational material (shared in after visit summary) and nutrition counseling/recommendations      Down 7 lb and A1C improved but did not tolerate Mounjaro 5 mg. Pause Mounjaro 2.5 mg. Stay on metformin 1000 mg bid. Recheck A1C and weight in office in 3 months  and reassess Mounjaro at that time.     Return in about 3 months (around 7/29/2024) for Diabetes, weight follow up, A1C in office.     Naya Cerda MD

## 2024-04-23 NOTE — LETTER
April 23, 2024   Wale Aguila  Component      Latest Ref Rn 3/31/2023 12/20/2023 4/22/2024   Hemoglobin A1C      4.80 - 5.60 % 6.10 (H)  6.6 !  6.10 (H)       Component      Latest Ref Rng 3/31/2023 4/22/2024   Total Cholesterol      0 - 200 mg/dL 116  95    Triglycerides      0 - 150 mg/dL 398 (H)  365 (H)    HDL Cholesterol      40 - 60 mg/dL 27 (L)  30 (L)    LDL Cholesterol       0 - 100 mg/dL 32  14    VLDL Cholesterol Juan Carlos      5 - 40 mg/dL 57 (H)  51 (H)       Component      Latest Ref Rn 11/22/2021 4/22/2024   PSA      0.000 - 4.000 ng/mL 1.230  1.450      Component      Latest Ref Rn 4/22/2024   WBC      3.40 - 10.80 10*3/mm3 8.24    RBC      4.14 - 5.80 10*6/mm3 4.45    Hemoglobin      13.0 - 17.7 g/dL 15.0    Hematocrit      37.5 - 51.0 % 42.8    MCV      79.0 - 97.0 fL 96.2    MCH      26.6 - 33.0 pg 33.7 (H)    MCHC      31.5 - 35.7 g/dL 35.0    RDW      12.3 - 15.4 % 12.5    Platelets      140 - 450 10*3/mm3 231       Component      Latest Ref Rn 3/31/2023 4/22/2024   Glucose      65 - 99 mg/dL 147 (H)  144 (H)    BUN      8 - 23 mg/dL 14  13    Creatinine      0.76 - 1.27 mg/dL 1.11  1.43 (H)    BUN/Creatinine Ratio      7.0 - 25.0  12.6  9.1    Sodium      136 - 145 mmol/L 142  138    Potassium      3.5 - 5.2 mmol/L 4.6  4.3    Chloride      98 - 107 mmol/L 106  102    CO2      22.0 - 29.0 mmol/L 27.1  24.6    Calcium      8.6 - 10.5 mg/dL 10.3  9.9    Total Protein      6.0 - 8.5 g/dL 6.4  6.5    Albumin      3.5 - 5.2 g/dL 4.5  4.6    Globulin      gm/dL 1.9  1.9    A/G Ratio      g/dL 2.4  2.4    Total Bilirubin      0.0 - 1.2 mg/dL 0.3  0.4    Alkaline Phosphatase      39 - 117 U/L 95  121 (H)    AST (SGOT)      1 - 40 U/L 15  21    ALT (SGPT)      1 - 41 U/L 25  36    EGFR Result      >60.0 mL/min/1.73 76.0  55.7 (L)       Component      Latest Ref Rng 3/31/2023 4/22/2024   Vitamin B-12      211 - 946 pg/mL [phone]

## 2024-04-25 DIAGNOSIS — N28.9 ABNORMAL RENAL FUNCTION FINDING: Primary | ICD-10-CM

## 2024-05-16 DIAGNOSIS — N52.9 VASCULOGENIC ERECTILE DYSFUNCTION, UNSPECIFIED VASCULOGENIC ERECTILE DYSFUNCTION TYPE: ICD-10-CM

## 2024-05-17 RX ORDER — SILDENAFIL 50 MG/1
50 TABLET, FILM COATED ORAL DAILY PRN
Qty: 90 TABLET | Refills: 1 | Status: SHIPPED | OUTPATIENT
Start: 2024-05-17

## 2024-05-20 ENCOUNTER — PATIENT MESSAGE (OUTPATIENT)
Dept: INTERNAL MEDICINE | Facility: CLINIC | Age: 64
End: 2024-05-20
Payer: OTHER GOVERNMENT

## 2024-05-20 DIAGNOSIS — R23.9 SKIN CHANGE: Primary | ICD-10-CM

## 2024-05-20 NOTE — TELEPHONE ENCOUNTER
From: Wale Aguila  To: Naya Cerda  Sent: 5/20/2024 12:41 PM EDT  Subject: Dermatology Referral     I think we discussed a new dermatology referral on our last visit. Will I be contacted by your staff to schedule the appointment?    Thank you

## 2024-06-11 DIAGNOSIS — J30.89 ENVIRONMENTAL AND SEASONAL ALLERGIES: ICD-10-CM

## 2024-06-12 RX ORDER — FLUTICASONE PROPIONATE 50 MCG
2 SPRAY, SUSPENSION (ML) NASAL DAILY
Qty: 96 G | Refills: 3 | Status: SHIPPED | OUTPATIENT
Start: 2024-06-12

## 2024-12-23 DIAGNOSIS — I10 ESSENTIAL HYPERTENSION: ICD-10-CM

## 2024-12-24 RX ORDER — LISINOPRIL 10 MG/1
10 TABLET ORAL NIGHTLY
Qty: 90 TABLET | Refills: 3 | Status: SHIPPED | OUTPATIENT
Start: 2024-12-24

## 2025-02-05 ENCOUNTER — OFFICE VISIT (OUTPATIENT)
Dept: INTERNAL MEDICINE | Facility: CLINIC | Age: 65
End: 2025-02-05
Payer: OTHER GOVERNMENT

## 2025-02-05 VITALS
HEART RATE: 76 BPM | WEIGHT: 219.12 LBS | BODY MASS INDEX: 31.37 KG/M2 | TEMPERATURE: 98.4 F | OXYGEN SATURATION: 94 % | DIASTOLIC BLOOD PRESSURE: 82 MMHG | SYSTOLIC BLOOD PRESSURE: 154 MMHG | HEIGHT: 70 IN

## 2025-02-05 DIAGNOSIS — E11.59 TYPE 2 DIABETES MELLITUS WITH OTHER CIRCULATORY COMPLICATION, WITHOUT LONG-TERM CURRENT USE OF INSULIN: Primary | ICD-10-CM

## 2025-02-05 DIAGNOSIS — Z51.81 MEDICATION MONITORING ENCOUNTER: ICD-10-CM

## 2025-02-05 DIAGNOSIS — E78.00 PURE HYPERCHOLESTEROLEMIA: ICD-10-CM

## 2025-02-05 DIAGNOSIS — R79.9 ABNORMAL BLOOD CHEMISTRY: ICD-10-CM

## 2025-02-05 DIAGNOSIS — R05.1 ACUTE COUGH: ICD-10-CM

## 2025-02-05 DIAGNOSIS — Z12.5 PROSTATE CANCER SCREENING: ICD-10-CM

## 2025-02-05 DIAGNOSIS — I10 ESSENTIAL HYPERTENSION: ICD-10-CM

## 2025-02-05 DIAGNOSIS — E66.811 CLASS 1 OBESITY DUE TO EXCESS CALORIES WITH SERIOUS COMORBIDITY AND BODY MASS INDEX (BMI) OF 31.0 TO 31.9 IN ADULT: ICD-10-CM

## 2025-02-05 DIAGNOSIS — E66.09 CLASS 1 OBESITY DUE TO EXCESS CALORIES WITH SERIOUS COMORBIDITY AND BODY MASS INDEX (BMI) OF 31.0 TO 31.9 IN ADULT: ICD-10-CM

## 2025-02-05 LAB
EXPIRATION DATE: ABNORMAL
HBA1C MFR BLD: 7.7 % (ref 4.5–5.7)
Lab: ABNORMAL

## 2025-02-05 PROCEDURE — 99214 OFFICE O/P EST MOD 30 MIN: CPT | Performed by: FAMILY MEDICINE

## 2025-02-05 PROCEDURE — 83036 HEMOGLOBIN GLYCOSYLATED A1C: CPT | Performed by: FAMILY MEDICINE

## 2025-02-05 RX ORDER — ALBUTEROL SULFATE 90 UG/1
INHALANT RESPIRATORY (INHALATION) SEE ADMIN INSTRUCTIONS
COMMUNITY
Start: 2025-01-31

## 2025-02-05 NOTE — PROGRESS NOTES
"Chief Complaint  Diabetes and Cough (Recheck bronchitis and ears. Has a lot of pressure. Was seen at Urgent Care Friday.)    Subjective        Wale Aguila presents to [facility]  History of Present Illness  Stress with job  Changes under Trump administration.  Has to decide by 2/6 if retiring  Risks losing benefits.  Diabetes  He presents for his follow-up diabetic visit. He has type 2 diabetes mellitus. Current diabetic treatment includes oral agent (monotherapy) (some diarrhea at times with metformin). An ACE inhibitor/angiotensin II receptor blocker is being taken.   Hypertension  This is a chronic problem. The current episode started more than 1 year ago. The problem has been waxing and waning since onset. Current antihypertension treatment includes ACE inhibitors. The current treatment provides significant improvement. Compliance problems include exercise and diet.        Objective   Vital Signs:  /82   Pulse 76   Temp 98.4 °F (36.9 °C)   Ht 177.8 cm (70\")   Wt 99.4 kg (219 lb 1.9 oz)   SpO2 94%   BMI 31.44 kg/m²   Estimated body mass index is 31.44 kg/m² as calculated from the following:    Height as of this encounter: 177.8 cm (70\").    Weight as of this encounter: 99.4 kg (219 lb 1.9 oz).            Physical Exam  Vitals and nursing note reviewed.   Constitutional:       General: He is not in acute distress.     Appearance: Normal appearance. He is well-developed and well-groomed. He is obese. He is not ill-appearing, toxic-appearing or diaphoretic.   HENT:      Head: Normocephalic and atraumatic.      Right Ear: Hearing, tympanic membrane, ear canal and external ear normal.      Left Ear: Hearing, tympanic membrane, ear canal and external ear normal.   Eyes:      General: Lids are normal. No scleral icterus.        Right eye: No discharge.         Left eye: No discharge.      Extraocular Movements: Extraocular movements intact.   Cardiovascular:      Rate and " Rhythm: Normal rate and regular rhythm.   Pulmonary:      Effort: Pulmonary effort is normal.      Breath sounds: Normal breath sounds.   Musculoskeletal:      Cervical back: Neck supple.   Skin:     Coloration: Skin is not jaundiced or pale.   Neurological:      General: No focal deficit present.      Mental Status: He is alert and oriented to person, place, and time.   Psychiatric:         Attention and Perception: Attention and perception normal.         Mood and Affect: Mood and affect normal.         Speech: Speech normal.         Behavior: Behavior normal. Behavior is cooperative.         Thought Content: Thought content normal.         Cognition and Memory: Cognition and memory normal.         Judgment: Judgment normal.        Result Review :  The following data was reviewed by: Naya Cerda MD on 02/05/2025:  Lab Results   Component Value Date    HGBA1C 7.7 (A) 02/05/2025    HGBA1C 6.10 (H) 04/22/2024    HGBA1C 6.6 (A) 12/20/2023      Lab Results   Component Value Date    CHLPL 95 04/22/2024    TRIG 365 (H) 04/22/2024    HDL 30 (L) 04/22/2024    LDL 14 04/22/2024       Lab Results   Component Value Date    PSA 1.450 04/22/2024    PSA 1.230 11/22/2021    PSA 0.973 03/29/2019             Assessment and Plan   Diagnoses and all orders for this visit:    1. Type 2 diabetes mellitus with other circulatory complication, without long-term current use of insulin (Primary)  Overview:  Associated with hypertension and hyperlipidemia     Assessment & Plan:  Diabetes is worsening.   Continue current treatment regimen.  Work on diet. Discussed need to add another medicine if A1C is not <7 next visit.  Diabetes will be reassessed in 3 months on labs prior to next appointment, 5/5 or after    Orders:  -     POC Glycosylated Hemoglobin (Hb A1C)  -     Comprehensive Metabolic Panel; Future  -     Lipid panel; Future  -     Vitamin B12 & Folate; Future  -     TSH Rfx On Abnormal To Free T4; Future    2. Essential  hypertension  Assessment & Plan:  Hypertension is borderline  Ambulatory blood pressure monitoring.  Blood pressure is usually better than today; under immense stress with Trump changes, pressure to retire. Continue current medicine.  Blood pressure will be reassessedat the next regular appointment.    Orders:  -     CBC (No Diff); Future  -     Comprehensive Metabolic Panel; Future  -     TSH Rfx On Abnormal To Free T4; Future    3. Pure hypercholesterolemia  Assessment & Plan:  Fasting labs prior to next appointment.    Orders:  -     Comprehensive Metabolic Panel; Future  -     Lipid panel; Future    4. Class 1 obesity due to excess calories with serious comorbidity and body mass index (BMI) of 31.0 to 31.9 in adult  Overview:  Associated with hypertension, hyperlipidemia, diabetes    Orders:  -     Vitamin D,25-Hydroxy; Future    5. Acute cough  Comments:  give time, likely postviral    6. Abnormal blood chemistry  -     CBC (No Diff); Future  -     Comprehensive Metabolic Panel; Future  -     Lipid panel; Future  -     Hemoglobin A1c; Future  -     Vitamin B12 & Folate; Future  -     Vitamin D,25-Hydroxy; Future  -     TSH Rfx On Abnormal To Free T4; Future    7. Medication monitoring encounter  Comments:  have labs drawn approx 2-7 days prior to next visit  Orders:  -     CBC (No Diff); Future  -     Comprehensive Metabolic Panel; Future  -     Lipid panel; Future  -     Hemoglobin A1c; Future  -     Vitamin B12 & Folate; Future  -     Vitamin D,25-Hydroxy; Future  -     TSH Rfx On Abnormal To Free T4; Future    8. Prostate cancer screening  -     PSA Screen; Future              Follow Up   Return in about 3 months (around 5/12/2025) for Annual physical, fasting labs 2-7 days prior to visit.  Patient was given instructions and counseling regarding his condition or for health maintenance advice. Please see specific information pulled into the AVS if appropriate.

## 2025-02-06 NOTE — ASSESSMENT & PLAN NOTE
Diabetes is worsening.   Continue current treatment regimen.  Work on diet. Discussed need to add another medicine if A1C is not <7 next visit.  Diabetes will be reassessed in 3 months on labs prior to next appointment, 5/5 or after

## 2025-02-06 NOTE — ASSESSMENT & PLAN NOTE
Hypertension is borderline  Ambulatory blood pressure monitoring.  Blood pressure is usually better than today; under immense stress with Trump changes, pressure to retire. Continue current medicine.  Blood pressure will be reassessedat the next regular appointment.

## 2025-05-13 DIAGNOSIS — E78.00 PURE HYPERCHOLESTEROLEMIA: ICD-10-CM

## 2025-05-14 ENCOUNTER — OFFICE VISIT (OUTPATIENT)
Dept: INTERNAL MEDICINE | Facility: CLINIC | Age: 65
End: 2025-05-14
Payer: OTHER GOVERNMENT

## 2025-05-14 VITALS
OXYGEN SATURATION: 95 % | HEART RATE: 80 BPM | SYSTOLIC BLOOD PRESSURE: 148 MMHG | BODY MASS INDEX: 31.12 KG/M2 | DIASTOLIC BLOOD PRESSURE: 78 MMHG | TEMPERATURE: 97.7 F | WEIGHT: 217.4 LBS | HEIGHT: 70 IN

## 2025-05-14 DIAGNOSIS — Z12.11 COLON CANCER SCREENING: ICD-10-CM

## 2025-05-14 DIAGNOSIS — I10 ESSENTIAL HYPERTENSION: ICD-10-CM

## 2025-05-14 DIAGNOSIS — E11.59 TYPE 2 DIABETES MELLITUS WITH OTHER CIRCULATORY COMPLICATION, WITHOUT LONG-TERM CURRENT USE OF INSULIN: ICD-10-CM

## 2025-05-14 DIAGNOSIS — E66.09 CLASS 1 OBESITY DUE TO EXCESS CALORIES WITH SERIOUS COMORBIDITY AND BODY MASS INDEX (BMI) OF 31.0 TO 31.9 IN ADULT: ICD-10-CM

## 2025-05-14 DIAGNOSIS — Z00.00 ANNUAL PHYSICAL EXAM: Primary | ICD-10-CM

## 2025-05-14 DIAGNOSIS — E78.00 PURE HYPERCHOLESTEROLEMIA: ICD-10-CM

## 2025-05-14 DIAGNOSIS — Z23 NEED FOR PNEUMOCOCCAL VACCINATION: ICD-10-CM

## 2025-05-14 DIAGNOSIS — E66.811 CLASS 1 OBESITY DUE TO EXCESS CALORIES WITH SERIOUS COMORBIDITY AND BODY MASS INDEX (BMI) OF 31.0 TO 31.9 IN ADULT: ICD-10-CM

## 2025-05-14 RX ORDER — LISINOPRIL 20 MG/1
20 TABLET ORAL NIGHTLY
Qty: 90 TABLET | Refills: 3 | Status: SHIPPED | OUTPATIENT
Start: 2025-05-14

## 2025-05-14 RX ORDER — ATORVASTATIN CALCIUM 40 MG/1
40 TABLET, FILM COATED ORAL NIGHTLY
Qty: 90 TABLET | Refills: 3 | Status: SHIPPED | OUTPATIENT
Start: 2025-05-14

## 2025-05-14 RX ORDER — ATORVASTATIN CALCIUM 40 MG/1
TABLET, FILM COATED ORAL
Qty: 90 TABLET | Refills: 3 | OUTPATIENT
Start: 2025-05-14

## 2025-05-14 NOTE — PROGRESS NOTES
05/14/2025  Chief Complaint   Patient presents with    Annual Exam       Patient Care Team:  Naya Cerda MD as PCP - General (Family Medicine)]       Wale Aguila is here for his annual preventive exam. History per MA reviewed.     Job ended--had to retire  Now going to gym 3x a week  Goes to chiropractic--was told to try magnesium complex             Health Maintenance   Topic Date Due    URINE MICROALBUMIN-CREATININE RATIO (uACR)  Never done    Pneumococcal Vaccine 50+ (2 of 2 - PCV) 11/12/2013    DIABETIC FOOT EXAM  12/08/2022    DIABETIC EYE EXAM  12/08/2022    COLORECTAL CANCER SCREENING  01/01/2025    ANNUAL PHYSICAL  04/23/2025    COVID-19 Vaccine (6 - 2024-25 season) 01/01/2026 (Originally 9/1/2024)    INFLUENZA VACCINE  07/01/2025    HEMOGLOBIN A1C  11/12/2025    LIPID PANEL  05/12/2026    TDAP/TD VACCINES (4 - Td or Tdap) 10/01/2027    HEPATITIS C SCREENING  Completed    ZOSTER VACCINE  Completed       Immunization History   Administered Date(s) Administered    Arexvy (RSV, Adults 60+ yrs) 10/07/2023    COVID-19 (MODERNA) 12YRS+ (SPIKEVAX) 10/07/2023    COVID-19 (MODERNA) 1st,2nd,3rd Dose Monovalent 02/22/2021, 03/22/2021, 11/20/2021    COVID-19 (PFIZER) BIVALENT 12+YRS 01/06/2023    Flu Vaccine Quad PF >36MO 09/11/2021    FluMist 2-49yrs 10/01/2017    Flucelvax Quad Vial =>4yrs 09/28/2018    Fluzone (or Fluarix & Flulaval for VFC) >6mos 10/07/2020, 09/11/2021    Influenza Injectable Mdck Pf Quad 10/07/2023    Influenza TIV (IM) 10/01/2017    Influenza, Unspecified 10/26/2010, 09/06/2011, 09/11/2021, 10/03/2022    Pneumococcal Polysaccharide (PPSV23) 11/12/2012    Shingrix 10/07/2020, 01/17/2021    Td (TDVAX) 10/01/2015    Tdap 11/12/2012, 10/01/2017    Zostavax 03/20/2017    flucelvax quad pfs =>4 YRS 10/01/2019         The following portions of the patient's history were reviewed and updated as appropriate: allergies, current medications, past family history, past medical history, past social  "history, past surgical history and problem list.    Objective   Visit Vitals  /78   Pulse 80   Temp 97.7 °F (36.5 °C)   Ht 177.8 cm (70\")   Wt 98.6 kg (217 lb 6.4 oz)   SpO2 95%   BMI 31.19 kg/m²              Physical Exam  Vitals and nursing note reviewed.   Constitutional:       General: He is not in acute distress.     Appearance: Normal appearance. He is well-developed and well-groomed. He is obese. He is not ill-appearing, toxic-appearing or diaphoretic.   HENT:      Head: Normocephalic and atraumatic.      Right Ear: Hearing, tympanic membrane, ear canal and external ear normal.      Left Ear: Hearing, tympanic membrane, ear canal and external ear normal.      Ears:      Comments: Bilateral hearing aids     Nose: Nose normal.      Mouth/Throat:      Mouth: Mucous membranes are moist.   Eyes:      General: Lids are normal. Gaze aligned appropriately. No scleral icterus.        Right eye: No discharge.         Left eye: No discharge.      Extraocular Movements: Extraocular movements intact.      Conjunctiva/sclera: Conjunctivae normal.      Pupils: Pupils are equal, round, and reactive to light.   Neck:      Thyroid: No thyromegaly.      Trachea: Phonation normal.   Cardiovascular:      Rate and Rhythm: Normal rate and regular rhythm.      Heart sounds: Normal heart sounds.   Pulmonary:      Effort: Pulmonary effort is normal.      Breath sounds: Normal breath sounds and air entry.   Abdominal:      General: Bowel sounds are normal. There is no distension.      Palpations: Abdomen is soft.      Tenderness: There is no abdominal tenderness. There is no guarding or rebound.   Musculoskeletal:         General: No deformity or signs of injury.      Cervical back: Neck supple.   Skin:     General: Skin is warm.      Capillary Refill: Capillary refill takes less than 2 seconds.      Coloration: Skin is not cyanotic, jaundiced or pale.      Findings: No rash.   Neurological:      General: No focal deficit " "present.      Mental Status: He is alert and oriented to person, place, and time. Mental status is at baseline.      Cranial Nerves: No dysarthria.      Motor: No tremor, abnormal muscle tone or seizure activity.      Gait: Gait is intact.   Psychiatric:         Attention and Perception: Attention and perception normal.         Mood and Affect: Mood and affect normal.         Speech: Speech normal.         Behavior: Behavior normal. Behavior is cooperative.         Thought Content: Thought content normal.         Cognition and Memory: Cognition and memory normal.         Judgment: Judgment normal.         Lab Results   Component Value Date    CHLPL 116 05/12/2025    TRIG 281 (H) 05/12/2025    HDL 29 (L) 05/12/2025    LDL 44 05/12/2025     Lab Results   Component Value Date    TSH 1.970 05/12/2025     No results found for: \"FREET4\"  Lab Results   Component Value Date    HGBA1C 7.10 (H) 05/12/2025    HGBA1C 7.7 (A) 02/05/2025    HGBA1C 6.10 (H) 04/22/2024     Letter from Naya Cerda MD (05/14/2025)     Assessment   Diagnoses and all orders for this visit:    1. Annual physical exam (Primary)    2. Type 2 diabetes mellitus with other circulatory complication, without long-term current use of insulin  Assessment & Plan:  Diabetes is improving with treatment.   Continue current treatment regimen.  Continue exercise. Requesting last eye exam report.  Diabetes will be reassessed  at next visit with A1C in office    Orders:  -     metFORMIN (GLUCOPHAGE) 1000 MG tablet; Take 1 tablet by mouth 2 (Two) Times a Day With Meals. For diabetes  Dispense: 180 tablet; Refill: 3    3. Essential hypertension  Assessment & Plan:  Hypertension is borderline  Increased lisinopril from 10 mg to 20 mg nightly. Continue exercise.  Blood pressure will be reassessedat the next regular appointment.    Orders:  -     lisinopril (PRINIVIL,ZESTRIL) 20 MG tablet; Take 1 tablet by mouth Every Night. For high blood pressure and kidney " protection  Dispense: 90 tablet; Refill: 3    4. Pure hypercholesterolemia  Assessment & Plan:  Meeting total cholesterol and LDL goals. Stay on statin. Recheck lipid profile in a year.    Orders:  -     atorvastatin (LIPITOR) 40 MG tablet; Take 1 tablet by mouth Every Night. To lower cholesterol and risk of stroke.  Dispense: 90 tablet; Refill: 3    5. Class 1 obesity due to excess calories with serious comorbidity and body mass index (BMI) of 31.0 to 31.9 in adult  Assessment & Plan:  Patient's (Body mass index is 31.19 kg/m².) indicates that they are obese (BMI >30) with health conditions that include hypertension, diabetes mellitus, and dyslipidemias . Weight is improving with lifestyle modifications. BMI  is above average; BMI management plan is completed. We discussed Information on healthy weight added to patient's after visit summary.       6. Need for pneumococcal vaccination  -     Pneumococcal Conjugate Vaccine 20-Valent All    7. Colon cancer screening  -     Cologuard - Stool, Per Rectum; Future         Health maintenance information provided via patient plan (after visit summary).   Counseled on age appropriate health screenings and immunizations.  Encouraged exercise and healthy diet.      Return in about 4 months (around 9/14/2025) for Diabetes follow up, A1C in office.     Naya Cerda MD

## 2025-05-14 NOTE — ASSESSMENT & PLAN NOTE
Meeting total cholesterol and LDL goals. Stay on statin. Recheck lipid profile in a year.   Introduction Text (Please End With A Colon): Fanta Cárdenas Other Procedure: nail clipping Detail Level: Detailed Instructions (Optional): Left hand 4th digit tinea vs nail dystrophy

## 2025-05-14 NOTE — ASSESSMENT & PLAN NOTE
Diabetes is improving with treatment.   Continue current treatment regimen.  Continue exercise. Requesting last eye exam report.  Diabetes will be reassessed  at next visit with A1C in office

## 2025-05-14 NOTE — ASSESSMENT & PLAN NOTE
Patient's (Body mass index is 31.19 kg/m².) indicates that they are obese (BMI >30) with health conditions that include hypertension, diabetes mellitus, and dyslipidemias . Weight is improving with lifestyle modifications. BMI  is above average; BMI management plan is completed. We discussed Information on healthy weight added to patient's after visit summary.

## 2025-05-14 NOTE — LETTER
May 14, 2025   Wale Aguila    Component      Latest Ref Rn 4/22/2024 2/5/2025 5/12/2025   Hemoglobin A1C      4.80 - 5.60 % 6.10 (H)  7.7 !  7.10 (H)       Component      Latest Ref Rn 4/22/2024 5/12/2025   Total Cholesterol      0 - 200 mg/dL 95  116    Triglycerides      0 - 150 mg/dL 365 (H)  281 (H)    HDL Cholesterol      40 - 60 mg/dL 30 (L)  29 (L)    VLDL Cholesterol Juan Carlos      5 - 40 mg/dL 51 (H)  43 (H)    LDL Cholesterol       0 - 100 mg/dL 14  44       Component      Latest Ref [facility] 5/12/2025   Glucose      65 - 99 mg/dL 158 (H)    BUN      8 - 23 mg/dL 10    Creatinine      0.76 - 1.27 mg/dL 1.25    BUN/Creatinine Ratio      7.0 - 25.0  8.0    Sodium      136 - 145 mmol/L 142    Potassium      3.5 - 5.2 mmol/L 4.5    Chloride      98 - 107 mmol/L 105    CO2      22.0 - 29.0 mmol/L 25.4    Calcium      8.6 - 10.5 mg/dL 9.6    Total Protein      6.0 - 8.5 g/dL 6.9    Albumin      3.5 - 5.2 g/dL 4.7    Globulin    gm/dL 2.2    A/G Ratio    g/dL 2.1    Total Bilirubin      0.0 - 1.2 mg/dL 0.2    Alkaline Phosphatase      39 - 117 U/L 138 (H)    AST (SGOT)      1 - 40 U/L 20    ALT (SGPT)      1 - 41 U/L 24    EGFR Result      >60.0 mL/min/1.73 65.1       Component      Latest Ref [facility] 5/12/2025   WBC      3.40 - 10.80 10*3/mm3 6.12    RBC      4.14 - 5.80 10*6/mm3 4.71    Hemoglobin      13.0 - 17.7 g/dL 15.6    Hematocrit      37.5 - 51.0 % 46.2    MCV      79.0 - 97.0 fL 98.1 (H)    MCH      26.6 - 33.0 pg 33.1 (H)    MCHC      31.5 - 35.7 g/dL 33.8    RDW      12.3 - 15.4 % 12.5    Platelets      140 - 450 10*3/mm3 217       Component      Latest Ref Rng 5/12/2025   Vitamin B-12      211 - 946 pg/mL 444    Folate      4.78 - 24.20 ng/mL >20.00      25 Hydroxy, Vitamin D      30.0 - 100.0 ng/ml 69.1      TSH Baseline      0.270 - 4.200 uIU/mL 1.970      PSA      0.000 - 4.000 ng/mL 1.480

## 2025-05-14 NOTE — ASSESSMENT & PLAN NOTE
Hypertension is borderline  Increased lisinopril from 10 mg to 20 mg nightly. Continue exercise.  Blood pressure will be reassessedat the next regular appointment.

## 2025-05-14 NOTE — PATIENT INSTRUCTIONS
Health Maintenance, Male  Adopting a healthy lifestyle and getting preventive care are important in promoting health and wellness. Ask your health care provider about:  The right schedule for you to have regular tests and exams.  Things you can do on your own to prevent diseases and keep yourself healthy.    What should I know about diet, weight, and exercise?  Eat a healthy diet    Eat a diet that includes plenty of vegetables, fruits, low-fat dairy products, and lean protein.  Do not eat a lot of foods that are high in solid fats, added sugars, or sodium.    Maintain a healthy weight  Body mass index (BMI) is a measurement that can be used to identify possible weight problems. It estimates body fat based on height and weight. Your health care provider can help determine your BMI and help you achieve or maintain a healthy weight.    Get regular exercise  Get regular exercise. This is one of the most important things you can do for your health. Most adults should:  Exercise for at least 150 minutes each week. The exercise should increase your heart rate and make you sweat (moderate-intensity exercise).  Do strengthening exercises at least twice a week. This is in addition to the moderate-intensity exercise.  Spend less time sitting. Even light physical activity can be beneficial.    Watch cholesterol and blood lipids  Have your blood tested for lipids and cholesterol at 20 years of age, then have this test every 5 years.  You may need to have your cholesterol levels checked more often if:  Your lipid or cholesterol levels are high.  You are older than 40 years of age.  You are at high risk for heart disease.    What should I know about cancer screening?  Many types of cancers can be detected early and may often be prevented. Depending on your health history and family history, you may need to have cancer screening at various ages. This may include screening for:  Colorectal cancer.  Prostate cancer.  Skin  cancer.  Lung cancer.    What should I know about heart disease, diabetes, and high blood pressure?  Blood pressure and heart disease  High blood pressure causes heart disease and increases the risk of stroke. This is more likely to develop in people who have high blood pressure readings or are overweight.  Talk with your health care provider about your target blood pressure readings.  Have your blood pressure checked:  Every 3-5 years if you are 18-39 years of age.  Every year if you are 40 years old or older.  If you are between the ages of 65 and 75 and are a current or former smoker, ask your health care provider if you should have a one-time screening for abdominal aortic aneurysm (AAA).    Diabetes  Have regular diabetes screenings. This checks your fasting blood sugar level. Have the screening done:  Once every three years after age 45 if you are at a normal weight and have a low risk for diabetes.  More often and at a younger age if you are overweight or have a high risk for diabetes.    What should I know about preventing infection?  Hepatitis B  If you have a higher risk for hepatitis B, you should be screened for this virus. Talk with your health care provider to find out if you are at risk for hepatitis B infection.  Hepatitis C  Blood testing is recommended for:  All adults aged 18 to 79 years  Anyone with known risk factors for hepatitis C.  Sexually transmitted infections (STIs)  You should be screened each year for STIs, including gonorrhea and chlamydia, if:  You are sexually active and are younger than 24 years of age.  You are older than 24 years of age and your health care provider tells you that you are at risk for this type of infection.  Your sexual activity has changed since you were last screened, and you are at increased risk for chlamydia or gonorrhea. Ask your health care provider if you are at risk.  Ask your health care provider about whether you are at high risk for HIV. Your health  care provider may recommend a prescription medicine to help prevent HIV infection. If you choose to take medicine to prevent HIV, you should first get tested for HIV. You should then be tested every 3 months for as long as you are taking the medicine.    Follow these instructions at home:  Alcohol use  Do not drink alcohol if your health care provider tells you not to drink.  If you drink alcohol:  Limit how much you have to 0-2 drinks a day.  Know how much alcohol is in your drink. In the [place] one drink equals one 12 oz bottle of beer (355 mL), one 5 oz glass of wine (148 mL), or one 1½ oz glass of hard liquor (44 mL).  Lifestyle  Do not use any products that contain nicotine or tobacco. These products include cigarettes, chewing tobacco, and vaping devices, such as e-cigarettes. If you need help quitting, ask your health care provider.  Do not use street drugs.  Do not share needles.  Ask your health care provider for help if you need support or information about quitting drugs.    General instructions  Schedule regular health, dental, and eye exams.  Stay current with your vaccines.  Tell your health care provider if:  You often feel depressed.  You have ever been abused or do not feel safe at home.    Summary  Adopting a healthy lifestyle and getting preventive care are important in promoting health and wellness.  Follow your health care provider's instructions about healthy diet, exercising, and getting tested or screened for diseases.  Follow your health care provider's instructions on monitoring your cholesterol and blood pressure.    This information is not intended to replace advice given to you by your health care provider. Make sure you discuss any questions you have with your health care provider.  Document Revised: 05/09/2022 Document Reviewed: 05/09/2022  [employer] © 2023 [employer]  Updated 2/29/24 tc

## 2025-05-23 ENCOUNTER — PATIENT MESSAGE (OUTPATIENT)
Dept: INTERNAL MEDICINE | Facility: CLINIC | Age: 65
End: 2025-05-23
Payer: OTHER GOVERNMENT

## 2025-05-23 DIAGNOSIS — M54.50 CHRONIC LOW BACK PAIN, UNSPECIFIED BACK PAIN LATERALITY, UNSPECIFIED WHETHER SCIATICA PRESENT: ICD-10-CM

## 2025-05-23 DIAGNOSIS — M54.2 NECK PAIN: ICD-10-CM

## 2025-05-23 DIAGNOSIS — G89.29 CHRONIC LOW BACK PAIN, UNSPECIFIED BACK PAIN LATERALITY, UNSPECIFIED WHETHER SCIATICA PRESENT: ICD-10-CM

## 2025-05-23 DIAGNOSIS — G89.29 CHRONIC RIGHT SHOULDER PAIN: Primary | ICD-10-CM

## 2025-05-23 DIAGNOSIS — M25.511 CHRONIC RIGHT SHOULDER PAIN: Primary | ICD-10-CM

## 2025-07-05 DIAGNOSIS — N52.9 VASCULOGENIC ERECTILE DYSFUNCTION, UNSPECIFIED VASCULOGENIC ERECTILE DYSFUNCTION TYPE: ICD-10-CM

## 2025-07-05 DIAGNOSIS — J30.89 ENVIRONMENTAL AND SEASONAL ALLERGIES: ICD-10-CM

## 2025-07-07 RX ORDER — FLUTICASONE PROPIONATE 50 MCG
2 SPRAY, SUSPENSION (ML) NASAL DAILY
Qty: 48 G | Refills: 3 | Status: SHIPPED | OUTPATIENT
Start: 2025-07-07

## 2025-07-07 RX ORDER — SILDENAFIL 50 MG/1
50 TABLET, FILM COATED ORAL DAILY PRN
Qty: 30 TABLET | Refills: 3 | Status: SHIPPED | OUTPATIENT
Start: 2025-07-07

## 2025-07-22 ENCOUNTER — PATIENT MESSAGE (OUTPATIENT)
Dept: INTERNAL MEDICINE | Facility: CLINIC | Age: 65
End: 2025-07-22
Payer: OTHER GOVERNMENT

## 2025-07-22 DIAGNOSIS — M25.511 CHRONIC RIGHT SHOULDER PAIN: Primary | ICD-10-CM

## 2025-07-22 DIAGNOSIS — G89.29 CHRONIC RIGHT SHOULDER PAIN: Primary | ICD-10-CM

## 2025-08-15 ENCOUNTER — PATIENT MESSAGE (OUTPATIENT)
Dept: INTERNAL MEDICINE | Facility: CLINIC | Age: 65
End: 2025-08-15
Payer: OTHER GOVERNMENT